# Patient Record
Sex: MALE | Race: BLACK OR AFRICAN AMERICAN | NOT HISPANIC OR LATINO | Employment: UNEMPLOYED | URBAN - METROPOLITAN AREA
[De-identification: names, ages, dates, MRNs, and addresses within clinical notes are randomized per-mention and may not be internally consistent; named-entity substitution may affect disease eponyms.]

---

## 2017-06-10 ENCOUNTER — OFFICE VISIT (OUTPATIENT)
Dept: FAMILY MEDICINE | Facility: CLINIC | Age: 27
End: 2017-06-10
Payer: COMMERCIAL

## 2017-06-10 VITALS
TEMPERATURE: 98 F | SYSTOLIC BLOOD PRESSURE: 89 MMHG | WEIGHT: 123.56 LBS | HEART RATE: 54 BPM | DIASTOLIC BLOOD PRESSURE: 63 MMHG | BODY MASS INDEX: 18.79 KG/M2

## 2017-06-10 DIAGNOSIS — J06.9 UPPER RESPIRATORY TRACT INFECTION, UNSPECIFIED TYPE: Primary | ICD-10-CM

## 2017-06-10 PROCEDURE — 99213 OFFICE O/P EST LOW 20 MIN: CPT | Mod: S$GLB,,, | Performed by: FAMILY MEDICINE

## 2017-06-10 PROCEDURE — 99999 PR PBB SHADOW E&M-EST. PATIENT-LVL II: CPT | Mod: PBBFAC,,, | Performed by: FAMILY MEDICINE

## 2017-06-10 RX ORDER — METHYLPREDNISOLONE 4 MG/1
TABLET ORAL
Qty: 1 PACKAGE | Refills: 0 | Status: SHIPPED | OUTPATIENT
Start: 2017-06-10 | End: 2017-07-01

## 2017-06-10 NOTE — PROGRESS NOTES
Jayjay Monge presents with moderate upper respiratory congestion,rhinnorhea,moderate cough past 2-3 days. OTC help slightly. Denies nausea,vomiting,diarrhea or significant fever.    Past Medical History:   Diagnosis Date    Asthma      Past Surgical History:   Procedure Laterality Date    NOSE SURGERY       Review of patient's allergies indicates:   Allergen Reactions    Zithromax [azithromycin] Hives     Current Outpatient Prescriptions on File Prior to Visit   Medication Sig Dispense Refill    alprazolam (XANAX) 0.25 MG tablet Take 1 tablet (0.25 mg total) by mouth 3 (three) times daily as needed for Anxiety. 60 tablet 0    [DISCONTINUED] cephALEXin (KEFLEX) 500 MG capsule Take 1 capsule (500 mg total) by mouth every 12 (twelve) hours. 14 capsule 0     Current Facility-Administered Medications on File Prior to Visit   Medication Dose Route Frequency Provider Last Rate Last Dose    promethazine injection 25 mg  25 mg Intramuscular 1 time in Clinic/HOD Rosalino Coyle MD         Social History     Social History    Marital status: Single     Spouse name: N/A    Number of children: N/A    Years of education: N/A     Occupational History     [employer]     Social History Main Topics    Smoking status: Current Every Day Smoker    Smokeless tobacco: Never Used    Alcohol use Yes    Drug use: No    Sexual activity: Not on file     Other Topics Concern    Not on file     Social History Narrative    No narrative on file     Family History   Problem Relation Age of Onset    Mitral valve prolapse Mother     Diabetes Father     Heart disease Father          ROS:  SKIN: No rashes, itching or changes in color or texture of skin.  EYES: Visual acuity fine. No photophobia, ocular pain or diplopia.EARS: Denies ear pain, discharge or vertigo.NOSE: No loss of smell, no epistaxis some postnasal drip.MOUTH & THROAT: No hoarseness or change in voice. No excessive gum bleeding.CHEST:  Denies LAM, cyanosis, wheezing  CARDIOVASCULAR: Denies chest pain, PND, orthopnea or reduced exercise tolerance.  ABDOMEN:  No weight loss.No abdominal pain, no hematemesis or blood in stool.  URINARY: No flank pain, dysuria or hematuria.  PERIPHERAL VASCULAR: No claudication or cyanosis.  MUSCULOSKELETAL: Negative   NEUROLOGIC: No history of seizures, paralysis, alteration of gait or coordination.    PE: Vital signs as noted  Heent:Normocephalic with no recent cranial trauma,PERRLA,EOMI,conjunctiva clear,fundi reveal no hemmorhage exudate or papilledema.Otic canals clear, tympanic membranes slightly dull bilaterally.Nasal mucosa slightly red and edematous.Posterior pharynx slightly red but without exudate.  Neck:Supple with minimal anterior cervical adenopathy.  Chest:Clear bilateral breath sounds with mild scattered ronchi  Heart:Regular rhthym without murmer  Abdomen:Soft, non tender,no masses, no hepatosplenomegalyExtremeties and Neurologic:Grossly within normal limits  Impression: Upper Respiratory Infection. 465.9  Plan:   Medrpl dspk

## 2017-11-20 ENCOUNTER — OFFICE VISIT (OUTPATIENT)
Dept: FAMILY MEDICINE | Facility: CLINIC | Age: 27
End: 2017-11-20
Payer: COMMERCIAL

## 2017-11-20 ENCOUNTER — TELEPHONE (OUTPATIENT)
Dept: FAMILY MEDICINE | Facility: CLINIC | Age: 27
End: 2017-11-20

## 2017-11-20 VITALS
HEART RATE: 80 BPM | DIASTOLIC BLOOD PRESSURE: 56 MMHG | SYSTOLIC BLOOD PRESSURE: 100 MMHG | HEIGHT: 68 IN | TEMPERATURE: 99 F | WEIGHT: 123.44 LBS | BODY MASS INDEX: 18.71 KG/M2

## 2017-11-20 DIAGNOSIS — L02.01 ABSCESS OF CHIN: Primary | ICD-10-CM

## 2017-11-20 DIAGNOSIS — L02.01 ABSCESS OF CHIN: ICD-10-CM

## 2017-11-20 PROCEDURE — 99213 OFFICE O/P EST LOW 20 MIN: CPT | Mod: 25,S$GLB,, | Performed by: NURSE PRACTITIONER

## 2017-11-20 PROCEDURE — 99999 PR PBB SHADOW E&M-EST. PATIENT-LVL III: CPT | Mod: PBBFAC,,, | Performed by: NURSE PRACTITIONER

## 2017-11-20 PROCEDURE — 10060 I&D ABSCESS SIMPLE/SINGLE: CPT | Mod: S$GLB,,, | Performed by: NURSE PRACTITIONER

## 2017-11-20 RX ORDER — ACETAMINOPHEN AND CODEINE PHOSPHATE 300; 60 MG/1; MG/1
1 TABLET ORAL
Qty: 10 TABLET | Refills: 0 | Status: SHIPPED | OUTPATIENT
Start: 2017-11-20 | End: 2017-11-23

## 2017-11-20 RX ORDER — ACETAMINOPHEN AND CODEINE PHOSPHATE 300; 60 MG/1; MG/1
1 TABLET ORAL
Qty: 10 TABLET | Refills: 0 | Status: SHIPPED | OUTPATIENT
Start: 2017-11-20 | End: 2017-11-20 | Stop reason: SDUPTHER

## 2017-11-20 NOTE — PATIENT INSTRUCTIONS
Remove packing in 3 days  Clean area daily with antibacterial soap such as Gold Dial or Lever 2000  Change dressing daily and as needed  Complete all of Bactrim Ds  Report to ER if symptoms worsen    Abscess (Incision & Drainage)  An abscess is sometimes called a boil. It happens when bacteria get trapped under the skin and start to grow. Pus forms inside the abscess as the body responds to the bacteria. An abscess can happen with an insect bite, ingrown hair, blocked oil gland, pimple, cyst, or puncture wound.  Your healthcare provider has drained the pus from your abscess. If the abscess pocket was large, your healthcare provider may have put in gauze packing. Your provider will need to remove it on your next visit. He or she may also replace it at that time. You may not need antibiotics to treat a simple abscess, unless the infection is spreading into the skin around the wound (cellulitis).  The wound will take about 1 to 2 weeks to heal, depending on the size of the abscess. Healthy tissue will grow from the bottom and sides of the opening until it seals over.  Home care  These tips can help your wound heal:  · The wound may drain for the first 2 days. Cover the wound with a clean dry dressing. Change the dressing if it becomes soaked with blood or pus.  · If a gauze packing was placed inside the abscess pocket, you may be told to remove it yourself. You may do this in the shower. Once the packing is removed, you should wash the area in the shower, or clean the area as directed by your provider. Continue to do this until the skin opening has closed. Make sure you wash your hands after changing the packing or cleaning the wound.  · If you were prescribed antibiotics, take them as directed until they are all gone.  · You may use acetaminophen or ibuprofen to control pain, unless another pain medicine was prescribed. If you have liver disease or ever had a stomach ulcer, talk with your doctor before using these  medicines.  Follow-up care  Follow up with your healthcare provider, or as advised. If a gauze packing was put in your wound, it should be removed in 1 to 2 days. Check your wound every day for any signs that the infection is getting worse. The signs are listed below.  When to seek medical advice  Call your healthcare provider right away if any of these occur:  · Increasing redness or swelling  · Red streaks in the skin leading away from the wound  · Increasing local pain or swelling  · Continued pus draining from the wound 2 days after treatment  · Fever of 100.4ºF (38ºC) or higher, or as directed by your healthcare provider  · Boil returns when you are at home  Date Last Reviewed: 9/1/2016  © 6186-5403 [employer]. [address] [place] [address], [place] [address]. All rights reserved. This information is not intended as a substitute for professional medical care. Always follow your healthcare professional's instructions.

## 2017-11-20 NOTE — PROGRESS NOTES
"Subjective:      Patient ID: Jayjay Monge is a 27 y.o. male.    Chief Complaint: Follow-up    Patient presents today after ER visit last night for "folliculitis" on chin.  He reports the ER "squeezed" the his chin and was able to get a small amount of purulent drainage out and sent him home with prescription for Bactrim DS and Bactroban.  He reports when he awakened this morning, his chin was more swollen and it has increased in size and pain.      Abscess   Chronicity:  NewProgression Since Onset: rapidly worsening  Location:  Face  Associated Symptoms: no fever, no chills, no sweats  Characteristics: painful, redness and swelling    Pain Scale:  7/10  Treatments Tried:  Warm compresses, topical antibiotics, draining/squeezing and oral antibiotics  Relieved by:  Nothing  Worsened by:  Nothing    Review of Systems   Constitutional: Negative for chills, fatigue and fever.   Respiratory: Negative for cough, shortness of breath and wheezing.    Cardiovascular: Negative for chest pain, palpitations and leg swelling.   Skin: Positive for wound. Negative for rash.   Neurological: Negative for weakness and numbness.   Psychiatric/Behavioral: The patient is not nervous/anxious.        Objective:     BP (!) 100/56   Pulse 80   Temp 98.7 °F (37.1 °C) (Oral)   Ht 5' 8" (1.727 m)   Wt 56 kg (123 lb 7.3 oz)   BMI 18.77 kg/m²     Physical Exam   Constitutional: He is oriented to person, place, and time. He appears well-developed and well-nourished.   HENT:   Head: Normocephalic.       Eyes: Pupils are equal, round, and reactive to light.   Cardiovascular: Normal rate, regular rhythm and normal heart sounds.    Pulmonary/Chest: Effort normal and breath sounds normal. No respiratory distress. He has no wheezes. He has no rales.   Neurological: He is alert and oriented to person, place, and time.   Skin: Skin is warm and dry. No rash noted.   Psychiatric: He has a normal mood and affect. His behavior is normal. " Judgment and thought content normal.   Vitals reviewed.    Assessment:     1. Abscess of chin        Plan:   Abscess of chin  -     acetaminophen-codeine 300-60mg (TYLENOL #4) 300-60 mg Tab; Take 1 tablet by mouth every 6 to 8 hours as needed.  Dispense: 10 tablet; Refill: 0    I&D as follows:  Consent signed prior to procedure.  Area cleaned with betadine, alcohol x 3.  Anesthetized with 2 cc of 2% plain lidocaine.  #11 blade used and 1 cm incision was made using sterile technique.  Purulent and serous substances drained from area and then packed with 1/4 inch sterile gauze.  Area cleaned and gauze and tape dressing was applied.  Patient tolerated procedure well.    Discussed care of wound with patient and his mother.  Educational handout provided.  Remove packing in 3 days  Clean area daily with antibacterial soap such as Gold Dial or Lever 2000  Change dressing daily and as needed  Complete all of Bactrim Ds and continue using Bactroban as directed  Report to ER if symptoms worsen  The patient was checked in the [facility] Board of Pharmacy's  Prescription Monitoring Program. There appears to be no incongruities with the patient's verbalized history.     Return in about 1 week (around 11/27/2017), or if symptoms worsen or fail to improve, for wound check.

## 2017-11-20 NOTE — TELEPHONE ENCOUNTER
----- Message from Adriana Felix sent at 11/20/2017  3:31 PM CST -----  Contact: Yeni/mom  Yeni called to speak with the nurse; the prescription for Tylenol 4 was sent to [facility] in [place] and they don't have it. The pharmacy said that the [facility] in [place] has it; so a new prescription should be sent to them.    [facility]  [address] [place] [id number]  [phone].    She can be contacted at [phone].    Thanks,  Adriana

## 2017-11-22 ENCOUNTER — OFFICE VISIT (OUTPATIENT)
Dept: FAMILY MEDICINE | Facility: CLINIC | Age: 27
End: 2017-11-22
Payer: COMMERCIAL

## 2017-11-22 VITALS
HEART RATE: 61 BPM | TEMPERATURE: 98 F | DIASTOLIC BLOOD PRESSURE: 75 MMHG | BODY MASS INDEX: 18.77 KG/M2 | SYSTOLIC BLOOD PRESSURE: 116 MMHG | WEIGHT: 123.44 LBS

## 2017-11-22 DIAGNOSIS — L02.01 ABSCESS OF CHIN: Primary | ICD-10-CM

## 2017-11-22 PROCEDURE — 99213 OFFICE O/P EST LOW 20 MIN: CPT | Mod: 25,S$GLB,, | Performed by: NURSE PRACTITIONER

## 2017-11-22 PROCEDURE — 99999 PR PBB SHADOW E&M-EST. PATIENT-LVL III: CPT | Mod: PBBFAC,,, | Performed by: NURSE PRACTITIONER

## 2017-11-22 NOTE — PROGRESS NOTES
Subjective:      Patient ID: Jayjay Monge is a 27 y.o. male.    Chief Complaint: No chief complaint on file.    HPI   Patient presents to clinic for a recheck of a chin abscess that was drained and packed 2 days ago.  He reports the swelling has significantly decreased and his symptoms have improved.  He reports he is still taking his Bactrim as prescribed.  He is requesting his packing be removed.    Review of Systems   Constitutional: Negative for chills, fatigue and fever.   Respiratory: Negative for cough, shortness of breath and wheezing.    Cardiovascular: Negative for chest pain, palpitations and leg swelling.   Skin: Positive for wound. Negative for rash.   Neurological: Negative for weakness and numbness.   Psychiatric/Behavioral: The patient is not nervous/anxious.        Objective:     /75   Pulse 61   Temp 98.4 °F (36.9 °C)   Wt 56 kg (123 lb 7.3 oz)   BMI 18.77 kg/m²     Physical Exam   Constitutional: He is oriented to person, place, and time. He appears well-developed and well-nourished.   HENT:   Head: Normocephalic.       Eyes: Pupils are equal, round, and reactive to light.   Cardiovascular: Normal rate, regular rhythm and normal heart sounds.    Pulmonary/Chest: Effort normal and breath sounds normal. No respiratory distress. He has no wheezes. He has no rales.   Neurological: He is alert and oriented to person, place, and time.   Skin: Skin is warm and dry. No rash noted.   Psychiatric: He has a normal mood and affect. His behavior is normal. Judgment and thought content normal.   Vitals reviewed.    Assessment:     1. Abscess of chin        Plan:   Abscess of chin    Packing removed from chin and purulent drainage was expressed from area.  Cleansed area with wound  and new gauze dressing placed.  Instructed patient to continue care as discussed on Monday:  Warm compresses to area; use antibacterial soap; complete Bactrim DS; good hand hygiene  Educational handout  provided  Report to ER if symptoms worsen    Return if symptoms worsen or fail to improve.

## 2017-11-22 NOTE — PATIENT INSTRUCTIONS
Wound Care After Packing Removal or Replacement  Packing is a special type of dressing placed inside a wound to help it heal. Once the packing is removed, you need to care for your wound. Good wound care helps prevent infection. Be sure to keep all follow-up appointments with your healthcare provider. Follow these instructions to take care of the wound once youre at home.  Home care  Your healthcare provider may prescribe medicines for pain. Or he or she may suggest an over-the-counter (OTC) pain reliever, such as ibuprofen or acetaminophen. Talk with your healthcare provider before taking any OTC medicines if you have chronic liver or kidney disease, a stomach ulcer, or gastrointestinal bleeding. In certain cases, you may also need to take antibiotics to help prevent infection. If so, take them exactly as directed for as long as directed. Dont stop taking your antibiotics until they are all gone, even if you feel better.  Here are some general care guidelines for your wound:  · Follow your healthcare providers instructions on how to care for your wound. Always wash your hands with soap and warm water before and after tending to your wound.  · If a bandage was put on, remove and change it once a day or as directed. If the bandage gets wet or dirty, replace it as soon as possible with a new bandage. Use a clean cloth to gently pat the wound dry.  · If your packing was replaced, a small piece of gauze may hang from the wound. It allows fluid to continue draining from the wound. You may need to use an ointment or cream to keep the packing from sticking to the bandage.  · Don't bathe in a tub or soak your wound until your healthcare provider says its OK. Take showers or sponge baths instead. Avoid swimming.  · Dont scratch, rub, scrub, or pick your wound.  · Check your wound daily for the signs of infection listed below.  If your wound was closed, it was likely with one of four types of closures. These include  stitches (sutures), strips of surgical tape, skin glue, and staples. Your healthcare provider will decide on the best closure based on the size and location of your wound. Each type of closure needs specific care.  · Sutures. You may want to clean the wound daily after the first 2 to 3 days. To do this, remove the bandage and gently wash the area with soap and warm water. After cleaning, apply a thin layer of antibiotic ointment if recommended. Then put on a new bandage. Sutures on the outside of the skin usually need to be removed by your healthcare provider.  · Surgical tape. Keep the area dry. If it gets wet, blot it dry with a towel. Surgical tape closures usually fall off within 7 to 10 days. If they have not fallen off after 10 days, you can remove them yourself. To remove the tape, use mineral oil or petroleum jelly on a cotton ball to gently rub the adhesive.  · Skin glue. You may shower or bathe as usual. But do not use soaps, lotions, or ointments on the wound area. Do not scrub the wound. After bathing, pat the wound dry with a soft towel. Do not apply liquids like peroxide, ointments, or creams to the wound while the strips or film is in place. Don't scratch, rub, or pick at the strips or film. Don't put tape directly over the strips or film. Skin adhesive film will fall off naturally in 5 to 10 days. If it does not peel off in 10 days, gently rub petroleum jelly or an ointment onto the film.  · Staples. Take showers or sponge baths. Don't take tub baths. Don't use lotions on the wound area. The area may be cleaned with soap and water 2 to 3 days after the wound was stapled. Don't scrub the wound. Pat it dry with a clean soft cloth or towel. You can use antibiotic ointment if your healthcare provider tells you to. Staples will need to be removed in 10 to 14 days.  Follow-up care  Follow up with your healthcare provider, or as directed. If your packing was replaced, you may need another visit within 1 to  3 days to remove or replace it. If you have sutures or staples, return for their removal as directed.  When to seek medical advice  Call your health care provider right away if you have signs of infection:  · Fever of 1 degree or more above your normal temperature, or as directed by your healthcare provider  · Increasing pain in the wound or pain that doesnt get better even with pain medicine  · Increasing redness or swelling  · Pus or bad-smelling drainage from the wound  Also call your healthcare provider right away if any of these occur:  · Your wound bleeds more than a small amount or wont stop bleeding.  · The edges of your wound come apart.  · You have numbness or weakness in the wound area that doesnt go away.  Date Last Reviewed: 10/2/2015  © 8937-4333 [employer]. [address] [place] [address], [place] [address]. All rights reserved. This information is not intended as a substitute for professional medical care. Always follow your healthcare professional's instructions.

## 2018-11-26 ENCOUNTER — OFFICE VISIT (OUTPATIENT)
Dept: FAMILY MEDICINE | Facility: CLINIC | Age: 28
End: 2018-11-26
Payer: MEDICAID

## 2018-11-26 VITALS
WEIGHT: 127 LBS | TEMPERATURE: 98 F | HEIGHT: 68 IN | BODY MASS INDEX: 19.25 KG/M2 | SYSTOLIC BLOOD PRESSURE: 110 MMHG | DIASTOLIC BLOOD PRESSURE: 69 MMHG | HEART RATE: 82 BPM

## 2018-11-26 DIAGNOSIS — J06.9 UPPER RESPIRATORY TRACT INFECTION, UNSPECIFIED TYPE: Primary | ICD-10-CM

## 2018-11-26 PROCEDURE — 99213 OFFICE O/P EST LOW 20 MIN: CPT | Mod: PBBFAC,PO | Performed by: NURSE PRACTITIONER

## 2018-11-26 PROCEDURE — 99999 PR PBB SHADOW E&M-EST. PATIENT-LVL III: CPT | Mod: PBBFAC,,, | Performed by: NURSE PRACTITIONER

## 2018-11-26 PROCEDURE — 99213 OFFICE O/P EST LOW 20 MIN: CPT | Mod: S$PBB,,, | Performed by: NURSE PRACTITIONER

## 2018-11-26 RX ORDER — AZELASTINE 1 MG/ML
1 SPRAY, METERED NASAL 2 TIMES DAILY
Qty: 30 ML | Refills: 0 | Status: SHIPPED | OUTPATIENT
Start: 2018-11-26 | End: 2018-11-30

## 2018-11-26 RX ORDER — LEVOCETIRIZINE DIHYDROCHLORIDE 5 MG/1
5 TABLET, FILM COATED ORAL NIGHTLY
Qty: 30 TABLET | Refills: 0 | Status: SHIPPED | OUTPATIENT
Start: 2018-11-26 | End: 2018-12-23 | Stop reason: SDUPTHER

## 2018-11-26 RX ORDER — METHYLPREDNISOLONE 4 MG/1
TABLET ORAL
Qty: 1 PACKAGE | Refills: 0 | Status: SHIPPED | OUTPATIENT
Start: 2018-11-26 | End: 2018-11-30

## 2018-11-26 NOTE — PROGRESS NOTES
Subjective:       Patient ID: Jayjay Monge is a 28 y.o. male.    Chief Complaint: Nasal Congestion (a) and Sore Throat    URI    This is a new problem. The current episode started yesterday. The problem has been unchanged. There has been no fever. Associated symptoms include congestion and rhinorrhea. Pertinent negatives include no abdominal pain, chest pain, coughing, diarrhea, dysuria, ear pain, headaches, joint pain, joint swelling, nausea, neck pain, plugged ear sensation, rash, sinus pain, sneezing, sore throat, swollen glands, vomiting or wheezing. Associated symptoms comments: Sinus pressure. He has tried nothing for the symptoms. The treatment provided no relief.     Past Medical History:   Diagnosis Date    Asthma      Social History     Socioeconomic History    Marital status: Single     Spouse name: Not on file    Number of children: Not on file    Years of education: Not on file    Highest education level: Not on file   Social Needs    Financial resource strain: Not on file    Food insecurity - worry: Not on file    Food insecurity - inability: Not on file    Transportation needs - medical: Not on file    Transportation needs - non-medical: Not on file   Occupational History     Employer: [employer]   Tobacco Use    Smoking status: Current Every Day Smoker    Smokeless tobacco: Never Used   Substance and Sexual Activity    Alcohol use: Yes    Drug use: No    Sexual activity: Not on file   Other Topics Concern    Not on file   Social History Narrative    Not on file     Past Surgical History:   Procedure Laterality Date    NOSE SURGERY         Review of Systems   Constitutional: Negative.    HENT: Positive for congestion, rhinorrhea and sinus pressure. Negative for ear pain, sinus pain, sneezing and sore throat.    Eyes: Negative.    Respiratory: Negative.  Negative for cough and wheezing.    Cardiovascular: Negative.  Negative for chest pain.    Gastrointestinal: Negative.  Negative for abdominal pain, diarrhea, nausea and vomiting.   Endocrine: Negative.    Genitourinary: Negative.  Negative for dysuria.   Musculoskeletal: Negative.  Negative for joint pain and neck pain.   Skin: Negative.  Negative for rash.   Allergic/Immunologic: Negative.    Neurological: Negative.  Negative for headaches.   Psychiatric/Behavioral: Negative.        Objective:      Physical Exam   Constitutional: He is oriented to person, place, and time. He appears well-developed and well-nourished.   HENT:   Head: Normocephalic.   Right Ear: Hearing, tympanic membrane, external ear and ear canal normal.   Left Ear: Hearing, tympanic membrane, external ear and ear canal normal.   Nose: Rhinorrhea present. Right sinus exhibits no maxillary sinus tenderness and no frontal sinus tenderness. Left sinus exhibits no maxillary sinus tenderness and no frontal sinus tenderness.   Mouth/Throat: Uvula is midline, oropharynx is clear and moist and mucous membranes are normal.   Eyes: Conjunctivae are normal. Pupils are equal, round, and reactive to light.   Neck: Normal range of motion. Neck supple.   Cardiovascular: Normal rate, regular rhythm and normal heart sounds.   Pulmonary/Chest: Effort normal and breath sounds normal.   Abdominal: Soft. Bowel sounds are normal.   Musculoskeletal: Normal range of motion.   Neurological: He is alert and oriented to person, place, and time.   Skin: Skin is warm and dry. Capillary refill takes 2 to 3 seconds.   Psychiatric: He has a normal mood and affect. His behavior is normal. Judgment and thought content normal.   Nursing note and vitals reviewed.      Assessment:       1. Upper respiratory tract infection, unspecified type        Plan:           Jayjay Rogel was seen today for nasal congestion and sore throat.    Diagnoses and all orders for this visit:    Upper respiratory tract infection, unspecified type  -     methylPREDNISolone (MEDROL DOSEPACK)  4 mg tablet; use as directed  -     azelastine (ASTELIN) 137 mcg (0.1 %) nasal spray; 1 spray (137 mcg total) by Nasal route 2 (two) times daily.  -     levocetirizine (XYZAL) 5 MG tablet; Take 1 tablet (5 mg total) by mouth every evening.

## 2018-11-30 ENCOUNTER — OFFICE VISIT (OUTPATIENT)
Dept: FAMILY MEDICINE | Facility: CLINIC | Age: 28
End: 2018-11-30
Payer: MEDICAID

## 2018-11-30 VITALS
DIASTOLIC BLOOD PRESSURE: 76 MMHG | BODY MASS INDEX: 18.64 KG/M2 | HEIGHT: 68 IN | TEMPERATURE: 98 F | HEART RATE: 111 BPM | SYSTOLIC BLOOD PRESSURE: 111 MMHG | WEIGHT: 123 LBS

## 2018-11-30 DIAGNOSIS — J01.00 ACUTE NON-RECURRENT MAXILLARY SINUSITIS: Primary | ICD-10-CM

## 2018-11-30 PROCEDURE — 99213 OFFICE O/P EST LOW 20 MIN: CPT | Mod: PBBFAC,PO | Performed by: NURSE PRACTITIONER

## 2018-11-30 PROCEDURE — 99213 OFFICE O/P EST LOW 20 MIN: CPT | Mod: S$PBB,,, | Performed by: NURSE PRACTITIONER

## 2018-11-30 PROCEDURE — 99999 PR PBB SHADOW E&M-EST. PATIENT-LVL III: CPT | Mod: PBBFAC,,, | Performed by: NURSE PRACTITIONER

## 2018-11-30 RX ORDER — FLUTICASONE PROPIONATE 50 MCG
2 SPRAY, SUSPENSION (ML) NASAL DAILY
Qty: 1 BOTTLE | Refills: 0 | Status: SHIPPED | OUTPATIENT
Start: 2018-11-30 | End: 2018-12-31 | Stop reason: SDUPTHER

## 2018-11-30 RX ORDER — AMOXICILLIN AND CLAVULANATE POTASSIUM 875; 125 MG/1; MG/1
1 TABLET, FILM COATED ORAL EVERY 12 HOURS
Qty: 20 TABLET | Refills: 0 | Status: SHIPPED | OUTPATIENT
Start: 2018-11-30 | End: 2018-12-10

## 2018-11-30 NOTE — PROGRESS NOTES
"Subjective:      Patient ID: Jayjay Monge is a 28 y.o. male.    Chief Complaint: Cough; Sore Throat; and Headache    Sinus Problem   This is a new problem. The current episode started in the past 7 days. The problem has been gradually worsening since onset. There has been no fever. The pain is moderate. Associated symptoms include congestion, coughing, headaches, sinus pressure, a sore throat and swollen glands. Pertinent negatives include no chills, ear pain, hoarse voice, neck pain or shortness of breath. Treatments tried: Treated with Medrol Dosepak, Xyzal, and Astelin on Monday.  He did not tolerate the Astelin stating gave him a headache.  He missed the last 2 days of his steroid pack. The treatment provided no relief.     Review of Systems   Constitutional: Negative for chills, fatigue and fever.   HENT: Positive for congestion, postnasal drip, rhinorrhea, sinus pressure and sore throat. Negative for ear pain, hoarse voice and sinus pain.    Eyes: Negative.    Respiratory: Positive for cough. Negative for shortness of breath and wheezing.    Cardiovascular: Negative for chest pain, palpitations and leg swelling.   Gastrointestinal: Negative.    Endocrine: Negative.    Genitourinary: Negative.    Musculoskeletal: Negative.  Negative for neck pain.   Skin: Negative for rash and wound.   Neurological: Positive for headaches.   Hematological: Negative.    Psychiatric/Behavioral: The patient is not nervous/anxious.        Objective:     /76   Pulse (!) 111   Temp 98.2 °F (36.8 °C)   Ht 5' 8" (1.727 m)   Wt 55.8 kg (123 lb)   BMI 18.70 kg/m²     Physical Exam   Constitutional: He is oriented to person, place, and time. He appears well-developed and well-nourished.   HENT:   Head: Normocephalic.   Right Ear: Tympanic membrane is bulging.   Left Ear: Tympanic membrane is bulging.   Nose: Mucosal edema and rhinorrhea (nasal mucosa erythematous and boggy) present. Right sinus exhibits maxillary sinus " tenderness. Right sinus exhibits no frontal sinus tenderness. Left sinus exhibits maxillary sinus tenderness. Left sinus exhibits no frontal sinus tenderness.   Mouth/Throat: Posterior oropharyngeal edema and posterior oropharyngeal erythema (clear, post nasal drainage noted to posterior oropharynx) present. No oropharyngeal exudate. Tonsils are 1+ on the right. Tonsils are 1+ on the left. No tonsillar exudate.   Eyes: Conjunctivae and lids are normal. Pupils are equal, round, and reactive to light.   Neck: Normal range of motion and full passive range of motion without pain. Neck supple.   Cardiovascular: Normal rate, regular rhythm and normal heart sounds.   Pulmonary/Chest: Effort normal and breath sounds normal. No respiratory distress. He has no decreased breath sounds. He has no wheezes. He has no rhonchi. He has no rales.   Lymphadenopathy:        Head (right side): Tonsillar adenopathy present.        Head (left side): Tonsillar adenopathy present.     He has no cervical adenopathy.   Neurological: He is alert and oriented to person, place, and time.   Skin: Skin is warm and dry. No rash noted.   Psychiatric: He has a normal mood and affect. His behavior is normal. Judgment and thought content normal.     Assessment:     1. Acute non-recurrent maxillary sinusitis        Plan:     Problem List Items Addressed This Visit     None      Visit Diagnoses     Acute non-recurrent maxillary sinusitis    -  Primary    Relevant Medications    amoxicillin-clavulanate 875-125mg (AUGMENTIN) 875-125 mg per tablet    fluticasone (FLONASE) 50 mcg/actuation nasal spray      Complete last days of Medrol Dosepak.  Symptomatic care discussed   Report to ER if symptoms worsen    Follow-up if symptoms worsen or fail to improve.        Parts of this note was dictated using voice recognition software. Please excuse any grammatical or typographical errors.

## 2018-12-24 RX ORDER — AZELASTINE 1 MG/ML
SPRAY, METERED NASAL
Qty: 30 ML | Refills: 11 | Status: SHIPPED | OUTPATIENT
Start: 2018-12-24 | End: 2019-12-18

## 2018-12-24 RX ORDER — LEVOCETIRIZINE DIHYDROCHLORIDE 5 MG/1
TABLET, FILM COATED ORAL
Qty: 30 TABLET | Refills: 11 | Status: SHIPPED | OUTPATIENT
Start: 2018-12-24 | End: 2019-12-18

## 2018-12-31 DIAGNOSIS — J01.00 ACUTE NON-RECURRENT MAXILLARY SINUSITIS: ICD-10-CM

## 2018-12-31 RX ORDER — FLUTICASONE PROPIONATE 50 MCG
SPRAY, SUSPENSION (ML) NASAL
Qty: 16 ML | Refills: 12 | Status: SHIPPED | OUTPATIENT
Start: 2018-12-31 | End: 2019-12-18

## 2019-08-28 ENCOUNTER — TELEPHONE (OUTPATIENT)
Dept: FAMILY MEDICINE | Facility: CLINIC | Age: 29
End: 2019-08-28

## 2019-08-28 NOTE — TELEPHONE ENCOUNTER
----- Message from Ayleen Levine sent at 8/28/2019  9:29 AM CDT -----  Contact: Mother  Type:  Same Day Appointment Request with Ms. Leslie Jackson    Caller is requesting a same day appointment.  Caller declined first available appointment listed below.    Name of Caller:Mother-Yeni Monge  When is the first available appointment?8/29/19  Symptoms:susana urine-sore throat-headaches  Best Call Back Number:[phone]  Additional Information: n/a

## 2019-08-29 ENCOUNTER — HOSPITAL ENCOUNTER (OUTPATIENT)
Dept: RADIOLOGY | Facility: HOSPITAL | Age: 29
Discharge: HOME | End: 2019-08-29
Attending: NURSE PRACTITIONER
Payer: COMMERCIAL

## 2019-08-29 ENCOUNTER — OFFICE VISIT (OUTPATIENT)
Dept: FAMILY MEDICINE | Facility: CLINIC | Age: 29
End: 2019-08-29
Payer: COMMERCIAL

## 2019-08-29 VITALS
BODY MASS INDEX: 18.91 KG/M2 | HEART RATE: 84 BPM | TEMPERATURE: 99 F | SYSTOLIC BLOOD PRESSURE: 104 MMHG | HEIGHT: 68 IN | WEIGHT: 124.81 LBS | DIASTOLIC BLOOD PRESSURE: 73 MMHG

## 2019-08-29 DIAGNOSIS — Z11.3 SCREENING FOR STDS (SEXUALLY TRANSMITTED DISEASES): ICD-10-CM

## 2019-08-29 DIAGNOSIS — R05.9 COUGH: ICD-10-CM

## 2019-08-29 DIAGNOSIS — R50.9 FEVER, UNSPECIFIED FEVER CAUSE: ICD-10-CM

## 2019-08-29 DIAGNOSIS — R06.2 WHEEZING: ICD-10-CM

## 2019-08-29 DIAGNOSIS — R82.998 DARK URINE: ICD-10-CM

## 2019-08-29 DIAGNOSIS — J40 BRONCHITIS: Primary | ICD-10-CM

## 2019-08-29 LAB
BACTERIA #/AREA URNS HPF: ABNORMAL /HPF
BILIRUB UR QL STRIP: NEGATIVE
CLARITY UR: CLEAR
COLOR UR: YELLOW
GLUCOSE UR QL STRIP: NEGATIVE
HGB UR QL STRIP: NEGATIVE
HYALINE CASTS #/AREA URNS LPF: 0 /LPF
KETONES UR QL STRIP: ABNORMAL
LEUKOCYTE ESTERASE UR QL STRIP: NEGATIVE
MICROSCOPIC COMMENT: ABNORMAL
NITRITE UR QL STRIP: NEGATIVE
PH UR STRIP: 6 [PH] (ref 5–8)
PROT UR QL STRIP: ABNORMAL
RBC #/AREA URNS HPF: 1 /HPF (ref 0–4)
SP GR UR STRIP: 1.02 (ref 1–1.03)
SQUAMOUS #/AREA URNS HPF: 6 /HPF
URN SPEC COLLECT METH UR: ABNORMAL
UROBILINOGEN UR STRIP-ACNC: ABNORMAL EU/DL
WBC #/AREA URNS HPF: 3 /HPF (ref 0–5)

## 2019-08-29 PROCEDURE — 71046 XR CHEST PA AND LATERAL: ICD-10-PCS | Mod: 26,,, | Performed by: RADIOLOGY

## 2019-08-29 PROCEDURE — 3008F PR BODY MASS INDEX (BMI) DOCUMENTED: ICD-10-PCS | Mod: CPTII,S$GLB,, | Performed by: NURSE PRACTITIONER

## 2019-08-29 PROCEDURE — 99999 PR PBB SHADOW E&M-EST. PATIENT-LVL III: ICD-10-PCS | Mod: PBBFAC,,, | Performed by: NURSE PRACTITIONER

## 2019-08-29 PROCEDURE — 99214 OFFICE O/P EST MOD 30 MIN: CPT | Mod: S$GLB,,, | Performed by: NURSE PRACTITIONER

## 2019-08-29 PROCEDURE — 81000 URINALYSIS NONAUTO W/SCOPE: CPT | Mod: PO

## 2019-08-29 PROCEDURE — 87491 CHLMYD TRACH DNA AMP PROBE: CPT

## 2019-08-29 PROCEDURE — 3008F BODY MASS INDEX DOCD: CPT | Mod: CPTII,S$GLB,, | Performed by: NURSE PRACTITIONER

## 2019-08-29 PROCEDURE — 99214 PR OFFICE/OUTPT VISIT, EST, LEVL IV, 30-39 MIN: ICD-10-PCS | Mod: S$GLB,,, | Performed by: NURSE PRACTITIONER

## 2019-08-29 PROCEDURE — 71046 X-RAY EXAM CHEST 2 VIEWS: CPT | Mod: 26,,, | Performed by: RADIOLOGY

## 2019-08-29 PROCEDURE — 99999 PR PBB SHADOW E&M-EST. PATIENT-LVL III: CPT | Mod: PBBFAC,,, | Performed by: NURSE PRACTITIONER

## 2019-08-29 PROCEDURE — 71046 X-RAY EXAM CHEST 2 VIEWS: CPT | Mod: TC,PO

## 2019-08-29 RX ORDER — PROMETHAZINE HYDROCHLORIDE AND DEXTROMETHORPHAN HYDROBROMIDE 6.25; 15 MG/5ML; MG/5ML
5 SYRUP ORAL 2 TIMES DAILY PRN
Qty: 118 ML | Refills: 0 | Status: SHIPPED | OUTPATIENT
Start: 2019-08-29 | End: 2019-09-08

## 2019-08-29 RX ORDER — ALBUTEROL SULFATE 90 UG/1
2 AEROSOL, METERED RESPIRATORY (INHALATION) EVERY 6 HOURS PRN
Qty: 18 G | Refills: 0 | Status: SHIPPED | OUTPATIENT
Start: 2019-08-29 | End: 2019-09-17 | Stop reason: SDUPTHER

## 2019-08-29 RX ORDER — METHYLPREDNISOLONE 4 MG/1
TABLET ORAL
Qty: 1 PACKAGE | Refills: 0 | Status: SHIPPED | OUTPATIENT
Start: 2019-08-29 | End: 2019-09-19

## 2019-08-29 RX ORDER — CEFDINIR 300 MG/1
300 CAPSULE ORAL 2 TIMES DAILY
Qty: 20 CAPSULE | Refills: 0 | Status: SHIPPED | OUTPATIENT
Start: 2019-08-29 | End: 2019-09-08

## 2019-08-29 NOTE — PROGRESS NOTES
Subjective:       Patient ID: Jayjay Monge is a 29 y.o. male.    Chief Complaint: congestion, headache, sore throat    Cough   This is a new problem. The current episode started 1 to 4 weeks ago. The problem has been unchanged. The problem occurs every few minutes. The cough is productive of sputum. Associated symptoms include a fever (resolved), headaches, nasal congestion, postnasal drip, rhinorrhea, shortness of breath and wheezing. Pertinent negatives include no chest pain, chills, ear congestion, ear pain, heartburn, hemoptysis, myalgias, rash, sore throat, sweats or weight loss. Nothing aggravates the symptoms. He has tried nothing for the symptoms. The treatment provided no relief. His past medical history is significant for environmental allergies. There is no history of asthma, bronchiectasis, bronchitis, COPD, emphysema or pneumonia.   Pt also states urine has been darker than usual over the past 2-3 d; states has not been hydrating. Denies dysuria, frequency. Pt also requests STD panel; denies known STD exposure or symptoms.  Past Medical History:   Diagnosis Date    Asthma      Social History     Socioeconomic History    Marital status: Single     Spouse name: Not on file    Number of children: Not on file    Years of education: Not on file    Highest education level: Not on file   Occupational History     Employer: [employer]   Social Needs    Financial resource strain: Not on file    Food insecurity:     Worry: Not on file     Inability: Not on file    Transportation needs:     Medical: Not on file     Non-medical: Not on file   Tobacco Use    Smoking status: Current Every Day Smoker    Smokeless tobacco: Never Used   Substance and Sexual Activity    Alcohol use: Yes    Drug use: No    Sexual activity: Not on file   Lifestyle    Physical activity:     Days per week: Not on file     Minutes per session: Not on file    Stress: Not on file   Relationships     Social connections:     Talks on phone: Not on file     Gets together: Not on file     Attends Uatsdin service: Not on file     Active member of club or organization: Not on file     Attends meetings of clubs or organizations: Not on file     Relationship status: Not on file   Other Topics Concern    Not on file   Social History Narrative    Not on file     Past Surgical History:   Procedure Laterality Date    NOSE SURGERY         Review of Systems   Constitutional: Positive for fever (resolved). Negative for chills and weight loss.   HENT: Positive for postnasal drip and rhinorrhea. Negative for ear pain and sore throat.    Eyes: Negative.    Respiratory: Positive for cough, shortness of breath and wheezing. Negative for hemoptysis.    Cardiovascular: Negative.  Negative for chest pain.   Gastrointestinal: Negative.  Negative for heartburn.   Endocrine: Negative.    Genitourinary: Negative.         Dark urine   Musculoskeletal: Negative.  Negative for myalgias.   Skin: Negative.  Negative for rash.   Allergic/Immunologic: Positive for environmental allergies.   Neurological: Positive for headaches.   Psychiatric/Behavioral: Negative.        Objective:      Physical Exam   Constitutional: He is oriented to person, place, and time. He appears well-developed and well-nourished.   HENT:   Head: Normocephalic.   Right Ear: Hearing, tympanic membrane, external ear and ear canal normal.   Left Ear: Hearing, tympanic membrane, external ear and ear canal normal.   Nose: Mucosal edema and rhinorrhea present. Right sinus exhibits maxillary sinus tenderness. Right sinus exhibits no frontal sinus tenderness. Left sinus exhibits maxillary sinus tenderness. Left sinus exhibits no frontal sinus tenderness.   Mouth/Throat: Uvula is midline, oropharynx is clear and moist and mucous membranes are normal.   Eyes: Pupils are equal, round, and reactive to light. Conjunctivae are normal.   Neck: Normal range of motion. Neck supple.    Cardiovascular: Normal rate, regular rhythm and normal heart sounds.   Pulmonary/Chest: Effort normal. He has wheezes in the right upper field, the right lower field, the left upper field and the left lower field.   Abdominal: Soft. Bowel sounds are normal.   Musculoskeletal: Normal range of motion.   Neurological: He is alert and oriented to person, place, and time.   Skin: Skin is warm and dry. Capillary refill takes 2 to 3 seconds.   Psychiatric: He has a normal mood and affect. His behavior is normal. Judgment and thought content normal.   Nursing note and vitals reviewed.      Assessment:       1. Bronchitis    2. Cough    3. Wheezing    4. Fever, unspecified fever cause    5. Screening for STDs (sexually transmitted diseases)    6. Dark urine        Plan:       Jayjay Rogel was seen today for congestion, headache, sore throat.    Diagnoses and all orders for this visit:    Bronchitis  Cough  Wheezing  Fever, unspecified fever cause  -     X-Ray Chest PA And Lateral; Future  -     albuterol (PROAIR HFA) 90 mcg/actuation inhaler; Inhale 2 puffs into the lungs every 6 (six) hours as needed for Wheezing. Rescue  -     promethazine-dextromethorphan (PROMETHAZINE-DM) 6.25-15 mg/5 mL Syrp; Take 5 mLs by mouth 2 (two) times daily as needed.  -     methylPREDNISolone (MEDROL DOSEPACK) 4 mg tablet; use as directed  -     cefdinir (OMNICEF) 300 MG capsule; Take 1 capsule (300 mg total) by mouth 2 (two) times daily. for 10 days      Screening for STDs (sexually transmitted diseases)  -     Urinalysis  -     C. trachomatis/N. gonorrhoeae by AMP DNA [facility]; Urine  -     HIV 1/2 Ag/Ab (4th Gen); Future  -     HEPATITIS PANEL, ACUTE; Future  -     HERPES SIMPLEX 1 & 2 IGM; Future  -     HERPES SIMPLEX 1&2 IGG; Future  -     RPR; Future    Dark urine  -     Urinalysis  -     Urinalysis Microscopic    Hydrate well  Tylenol OTC as directed  Report to ER immediately if symptoms worsen

## 2019-08-30 ENCOUNTER — TELEPHONE (OUTPATIENT)
Dept: FAMILY MEDICINE | Facility: CLINIC | Age: 29
End: 2019-08-30

## 2019-08-30 LAB
C TRACH DNA SPEC QL NAA+PROBE: NOT DETECTED
N GONORRHOEA DNA SPEC QL NAA+PROBE: NOT DETECTED

## 2019-08-30 NOTE — TELEPHONE ENCOUNTER
----- Message from Cecy Christianson sent at 8/30/2019  4:48 PM CDT -----  Contact: mother(Yeni)-[phone]  Type:  Patient Returning Call    Who Called:Jayjay Monge    Who Left Message for Patient:nurse  Does the patient know what this is regarding?:results  Would the patient rather a call back or a response via [facility]? Call back   Best Call Back Number:[phone]  Additional Information:

## 2019-08-31 NOTE — TELEPHONE ENCOUNTER
----- Message from Christiano Flores sent at 8/31/2019  9:42 AM CDT -----  Contact: self [phone]  Pt missed a call yesterday regarding his test results/pls call/thanks.

## 2019-09-03 ENCOUNTER — TELEPHONE (OUTPATIENT)
Dept: FAMILY MEDICINE | Facility: CLINIC | Age: 29
End: 2019-09-03

## 2019-09-03 DIAGNOSIS — R80.9 URINE PROTEIN INCREASED: Primary | ICD-10-CM

## 2019-09-03 NOTE — TELEPHONE ENCOUNTER
----- Message from Leslie Ty NP sent at 8/29/2019  4:35 PM CDT -----  Urine shows protein, trace ketones; likely due to current illness. Increase hydration; recheck UA in 2 w.

## 2019-09-03 NOTE — TELEPHONE ENCOUNTER
----- Message from Dayna Dougherty sent at 9/3/2019  9:26 AM CDT -----  Contact: pt  The pt request a call concerning his test results, no additional info given and can be reached at [phone] or [phone]///[email or web address]

## 2019-09-03 NOTE — TELEPHONE ENCOUNTER
----- Message from Sameera Flores sent at 9/3/2019  3:02 PM CDT -----  ..Type:  Patient Returning Call    Who Called Yeni gallego ( pt mom)   Who Left Message for Patient:  Does the patient know what this is regarding?: excuse from provider   Would the patient rather a call back or a response via [facility]? Call back   Best Call Back Number: [phone]  Additional Information: Yeni gallego ( pt mom) is requesting a call from nurse to f/u on a excuse for the pt

## 2019-09-03 NOTE — TELEPHONE ENCOUNTER
----- Message from Glenna Christianson sent at 9/3/2019 11:32 AM CDT -----  Contact: pt   Pt is calling in regards to getting a doctor excuse for her visit on 08/29/19 & 08/30/19. Pt states that excuse can be faxed to [phone].     [phone] (home)

## 2019-09-04 ENCOUNTER — TELEPHONE (OUTPATIENT)
Dept: FAMILY MEDICINE | Facility: CLINIC | Age: 29
End: 2019-09-04

## 2019-09-04 NOTE — TELEPHONE ENCOUNTER
----- Message from Sameera Flores sent at 9/4/2019  1:29 PM CDT -----  ..Type:  Patient Returning Call    Who Called: Yeni ( Pt mom )   Who Left Message for Patient:  Does the patient know what this is regarding?:excuse  Would the patient rather a call back or a response via [facility]? Call back   Best Call Back Number:[phone]  Additional Information: Yeni ( Pt mom )  states pt will need an excuse for Thurs 8/29 and 8/30

## 2019-09-04 NOTE — TELEPHONE ENCOUNTER
Spoke with Ms Yeni (mom), states she never received the letter of excuse that was faxed to her and would like to pick it up tomorrow, will print letter to place at .

## 2019-09-04 NOTE — TELEPHONE ENCOUNTER
----- Message from Sameera Flores sent at 9/4/2019  1:29 PM CDT -----  ..Type:  Patient Returning Call    Who Called: Yeni ( Pt mom )   Who Left Message for Patient:  Does the patient know what this is regarding?:excuse  Would the patient rather a call back or a response via [facility]? Call back   Best Call Back Number:[phone]  Additional Information: Yeni ( Pt mom )  states pt will need an excuse for Thurs 8/29 and 8/30

## 2019-09-17 RX ORDER — ALBUTEROL SULFATE 90 UG/1
AEROSOL, METERED RESPIRATORY (INHALATION)
Qty: 8.5 INHALER | Refills: 0 | Status: SHIPPED | OUTPATIENT
Start: 2019-09-17 | End: 2019-12-18

## 2019-12-18 ENCOUNTER — OFFICE VISIT (OUTPATIENT)
Dept: FAMILY MEDICINE | Facility: CLINIC | Age: 29
End: 2019-12-18
Payer: COMMERCIAL

## 2019-12-18 VITALS
WEIGHT: 125 LBS | DIASTOLIC BLOOD PRESSURE: 80 MMHG | OXYGEN SATURATION: 95 % | SYSTOLIC BLOOD PRESSURE: 125 MMHG | BODY MASS INDEX: 18.94 KG/M2 | HEART RATE: 75 BPM | RESPIRATION RATE: 16 BRPM | TEMPERATURE: 98 F | HEIGHT: 68 IN

## 2019-12-18 DIAGNOSIS — R68.89 FLU-LIKE SYMPTOMS: ICD-10-CM

## 2019-12-18 DIAGNOSIS — A09 INFECTIOUS DIARRHEA: Primary | ICD-10-CM

## 2019-12-18 PROBLEM — J45.909 ASTHMA: Status: ACTIVE | Noted: 2019-03-19

## 2019-12-18 LAB
INFLUENZA A, MOLECULAR: NEGATIVE
INFLUENZA B, MOLECULAR: NEGATIVE
SPECIMEN SOURCE: NORMAL

## 2019-12-18 PROCEDURE — 87502 INFLUENZA DNA AMP PROBE: CPT | Mod: PO

## 2019-12-18 PROCEDURE — 99999 PR PBB SHADOW E&M-EST. PATIENT-LVL III: CPT | Mod: PBBFAC,,, | Performed by: INTERNAL MEDICINE

## 2019-12-18 PROCEDURE — 99214 PR OFFICE/OUTPT VISIT, EST, LEVL IV, 30-39 MIN: ICD-10-PCS | Mod: S$GLB,,, | Performed by: INTERNAL MEDICINE

## 2019-12-18 PROCEDURE — 99214 OFFICE O/P EST MOD 30 MIN: CPT | Mod: S$GLB,,, | Performed by: INTERNAL MEDICINE

## 2019-12-18 PROCEDURE — 99999 PR PBB SHADOW E&M-EST. PATIENT-LVL III: ICD-10-PCS | Mod: PBBFAC,,, | Performed by: INTERNAL MEDICINE

## 2019-12-18 RX ORDER — DIPHENOXYLATE HYDROCHLORIDE AND ATROPINE SULFATE 2.5; .025 MG/1; MG/1
1 TABLET ORAL 4 TIMES DAILY PRN
Qty: 20 TABLET | Refills: 0 | Status: SHIPPED | OUTPATIENT
Start: 2019-12-18 | End: 2019-12-28

## 2019-12-18 RX ORDER — PROMETHAZINE HYDROCHLORIDE 25 MG/1
25 TABLET ORAL EVERY 6 HOURS PRN
Qty: 20 TABLET | Refills: 0 | Status: SHIPPED | OUTPATIENT
Start: 2019-12-18 | End: 2020-02-14

## 2019-12-18 RX ORDER — CIPROFLOXACIN 500 MG/1
500 TABLET ORAL 2 TIMES DAILY
Qty: 10 TABLET | Refills: 0 | Status: SHIPPED | OUTPATIENT
Start: 2019-12-18 | End: 2019-12-23

## 2019-12-18 RX ORDER — METRONIDAZOLE 500 MG/1
500 TABLET ORAL 3 TIMES DAILY
Qty: 15 TABLET | Refills: 0 | Status: SHIPPED | OUTPATIENT
Start: 2019-12-18 | End: 2019-12-23

## 2019-12-18 NOTE — LETTER
December 18, 2019      [facility]  [address] [place] [address]  Phone: [phone]  Fax: [phone]       Patient: Jayjay Monge   YOB: 1990  Date of Visit: 12/18/2019    To Whom It May Concern:      Morgan Monge  was at [facility] on 12/18/2019. Please excuse him on 12/18/19 and 12/19/19 He may return to work/school after scheduled holidays with no restrictions. If you have any questions or concerns, or if I can be of further assistance, please do not hesitate to contact me.        Sincerely,    MD Cayla Hernandez MA

## 2019-12-18 NOTE — PROGRESS NOTES
Subjective:      Patient ID: Jayjay Monge is a 29 y.o. male.    Chief Complaint: Chills; Fever (101.6); and Diarrhea    HPI     29M here to transition care from Dr. Coyle and discuss abdominal illness.     On Friday he stopped at a convenience store and ordered a turkey neck.  He woke up the next morning with diarrhea profuse and voluminous with no mucus or blood accompanied by chills and nausea.  He had a few episodes of nonbilious emesis.  His abdominal discomfort is cramping in nature.  He took ibuprofen which helped his fever reduced from 101.6 maximum.  No other sick contacts.  He has no history of colon issues.  He did have asthma as a child, but has been on no maintenance or rescue inhalers.  He has been struggling to keep food down.  He did eat and worn just today.  His girlfriend has been trying to give him Gatorade/electrolyte replacement solutions.  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  No 1 else in the household is sick.  He has not taken anything over-the-counter.  He feels his symptoms are worsening and is very weak.  Still with no blood in his stool.    Review of patient's allergies indicates:   Allergen Reactions    Zithromax [azithromycin] Hives       Current Outpatient Medications:     ciprofloxacin HCl (CIPRO) 500 MG tablet, Take 1 tablet (500 mg total) by mouth 2 (two) times daily. for 5 days, Disp: 10 tablet, Rfl: 0    diphenoxylate-atropine 2.5-0.025 mg (LOMOTIL) 2.5-0.025 mg per tablet, Take 1 tablet by mouth 4 (four) times daily as needed for Diarrhea., Disp: 20 tablet, Rfl: 0    metroNIDAZOLE (FLAGYL) 500 MG tablet, Take 1 tablet (500 mg total) by mouth 3 (three) times daily. for 5 days, Disp: 15 tablet, Rfl: 0    promethazine (PHENERGAN) 25 MG tablet, Take 1 tablet (25 mg total) by mouth every 6 (six) hours as needed for Nausea., Disp: 20 tablet, Rfl: 0    Past Medical History:   Diagnosis Date    Asthma      Past Surgical History:   Procedure Laterality Date    NOSE  SURGERY       Family History   Problem Relation Age of Onset    Mitral valve prolapse Mother     Diabetes Father     Heart disease Father      Social History     Socioeconomic History    Marital status: Single     Spouse name: Not on file    Number of children: Not on file    Years of education: Not on file    Highest education level: Not on file   Occupational History     Employer: [employer]   Social Needs    Financial resource strain: Not on file    Food insecurity:     Worry: Not on file     Inability: Not on file    Transportation needs:     Medical: Not on file     Non-medical: Not on file   Tobacco Use    Smoking status: Current Every Day Smoker     Packs/day: 0.25     Years: 10.00     Pack years: 2.50     Types: Cigarettes     Start date: 4/12/2009    Smokeless tobacco: Never Used   Substance and Sexual Activity    Alcohol use: Yes     Frequency: Monthly or less     Drinks per session: 1 or 2     Binge frequency: Never    Drug use: No    Sexual activity: Yes     Partners: Female   Lifestyle    Physical activity:     Days per week: Not on file     Minutes per session: Not on file    Stress: Not on file   Relationships    Social connections:     Talks on phone: Not on file     Gets together: Not on file     Attends Yarsanism service: Not on file     Active member of club or organization: Not on file     Attends meetings of clubs or organizations: Not on file     Relationship status: Not on file   Other Topics Concern    Not on file   Social History Narrative    Not on file      Review of Systems   Constitutional: Positive for activity change, appetite change, chills, fatigue and fever. Negative for unexpected weight change.   HENT: Negative.    Eyes: Negative.    Respiratory: Negative.    Cardiovascular: Negative.    Gastrointestinal: Positive for abdominal pain, diarrhea, nausea and vomiting. Negative for abdominal distention and blood in stool.   Genitourinary:  "Negative for decreased urine volume.   Musculoskeletal: Negative for myalgias.   Skin: Negative.    Allergic/Immunologic: Negative for food allergies and immunocompromised state.   Neurological: Negative for dizziness, syncope and light-headedness.   Hematological: Negative.    Psychiatric/Behavioral: Negative.          Objective:     Body mass index is 19.01 kg/m².  /80 (BP Location: Right arm, Patient Position: Sitting, BP Method: Medium (Automatic))   Pulse 75   Temp 98.4 °F (36.9 °C)   Resp 16   Ht 5' 8" (1.727 m)   Wt 56.7 kg (125 lb)   SpO2 95%   BMI 19.01 kg/m²       Physical Exam   Constitutional: He is oriented to person, place, and time. He appears well-developed. He appears distressed.   Thin appearing male, appears ill/nauseous. Girlfriend with him.   HENT:   Head: Normocephalic and atraumatic.   Dry MM, JVP flat   Eyes: Conjunctivae are normal. No scleral icterus.   Cardiovascular: Normal rate, regular rhythm, normal heart sounds and intact distal pulses.   No murmur heard.  Pulmonary/Chest: Effort normal and breath sounds normal.   Abdominal: Soft. He exhibits no distension. There is tenderness. There is no rebound and no guarding.   Hyperactive bowel sounds, diffuse tenderness   Musculoskeletal: He exhibits no tenderness.   Lymphadenopathy:     He has no cervical adenopathy.   Neurological: He is alert and oriented to person, place, and time. No sensory deficit.   Skin: Skin is warm and dry. Capillary refill takes 2 to 3 seconds. He is not diaphoretic.   Psychiatric: He has a normal mood and affect. His behavior is normal.   Nursing note and vitals reviewed.      Office Visit on 12/18/2019   Component Date Value Ref Range Status    Influenza A, Molecular 12/18/2019 Negative  Negative Final    Influenza B, Molecular 12/18/2019 Negative  Negative Final    Flu A & B Source 12/18/2019 NP   Final   Lab Visit on 08/29/2019   Component Date Value Ref Range Status    HIV 1/2 Ag/Ab 08/29/2019 " Negative  Negative Final    Hepatitis B Surface Ag 08/29/2019 Negative   Final    Hep B C IgM 08/29/2019 Negative   Final    Hep A IgM 08/29/2019 Negative   Final    Hepatitis C Ab 08/29/2019 Negative   Final    HSV Ab, IgM by EIA 08/29/2019 Negative  Negative Final    Comment: -------------------ADDITIONAL INFORMATION-------------------  This test has been modified from the 's   instructions. Its performance characteristics were   determined by [facility] in a manner consistent with   CLIA requirements. This test has not been cleared or   approved by the U.S. Food and Drug Administration.  Test Performed by:  [facility]  [address] [place] [address]      HSV 1 IgG 08/29/2019 Negative  Negative Final    HSV 2 IgG 08/29/2019 Negative  Negative Final    RPR 08/29/2019 Non-reactive  Non-reactive Final   Office Visit on 08/29/2019   Component Date Value Ref Range Status    Specimen UA 08/29/2019 Urine, Clean Catch   Final    Color, UA 08/29/2019 Yellow  Yellow, Straw, Winsome Final    Appearance, UA 08/29/2019 Clear  Clear Final    pH, UA 08/29/2019 6.0  5.0 - 8.0 Final    Specific Gravity, UA 08/29/2019 1.025  1.005 - 1.030 Final    Protein, UA 08/29/2019 1+* Negative Final    Comment: Recommend a 24 hour urine protein or a urine   protein/creatinine ratio if globulin induced proteinuria is  clinically suspected.      Glucose, UA 08/29/2019 Negative  Negative Final    Ketones, UA 08/29/2019 Trace* Negative Final    Bilirubin (UA) 08/29/2019 Negative  Negative Final    Occult Blood UA 08/29/2019 Negative  Negative Final    Nitrite, UA 08/29/2019 Negative  Negative Final    Urobilinogen, UA 08/29/2019 CANCELED  EU/dL Final    Result canceled by the ancillary.    Leukocytes, UA 08/29/2019 Negative  Negative Final    Chlamydia, Amplified DNA 08/29/2019 Not Detected  Not Detected Final    N gonorrhoeae, amplified DNA 08/29/2019 Not  Detected  Not Detected Final    RBC, UA 08/29/2019 1  0 - 4 /hpf Final    WBC, UA 08/29/2019 3  0 - 5 /hpf Final    Bacteria 08/29/2019 Few* None-Occ /hpf Final    Squam Epithel,  08/29/2019 6  /hpf Final    Hyaline Casts,  08/29/2019 0  0-1/lpf /lpf Final    Microscopic Comment 08/29/2019 SEE COMMENT   Final    Comment: Other formed elements not mentioned in the report are not   present in the microscopic examination.        No results found in the last 24 hours.   Assessment:     Encounter Diagnoses   Name Primary?    Infectious diarrhea Yes    Flu-like symptoms     Body mass index (BMI) of 19.0 to 19.9 in adult         Plan:     #Infectious diarrhea  -poct influenza negative (initial symptoms flu like)  -appears dehydrated on exam, but hemodynamically stable.   -hyperactive bowel sounds. Likely food poisoning.   -flagyl + cipro. Counseled no alcohol.   -phenergan nausea. Lomotil diarrhea.   -excuse work today and tomorrow  -cbc, cmp, mg to check electrolytes.   -rest, hydration  -slowly advance diet.     #BMI 19.01  -noted.     #History of asthma as a child  -controlled. No rescue inhaler- declines.     #HCM  -8/29/19: HIV negative     Has mychart. Return for annual exam.       Griselda Rodriguez M.D.    Orders Placed This Encounter   Procedures    Influenza A & B by Molecular    CBC auto differential    Comprehensive metabolic panel    Magnesium      Medications Ordered This Encounter   Medications    ciprofloxacin HCl (CIPRO) 500 MG tablet     Sig: Take 1 tablet (500 mg total) by mouth 2 (two) times daily. for 5 days     Dispense:  10 tablet     Refill:  0    diphenoxylate-atropine 2.5-0.025 mg (LOMOTIL) 2.5-0.025 mg per tablet     Sig: Take 1 tablet by mouth 4 (four) times daily as needed for Diarrhea.     Dispense:  20 tablet     Refill:  0    metroNIDAZOLE (FLAGYL) 500 MG tablet     Sig: Take 1 tablet (500 mg total) by mouth 3 (three) times daily. for 5 days     Dispense:  15 tablet      Refill:  0    promethazine (PHENERGAN) 25 MG tablet     Sig: Take 1 tablet (25 mg total) by mouth every 6 (six) hours as needed for Nausea.     Dispense:  20 tablet     Refill:  0

## 2020-02-12 ENCOUNTER — TELEPHONE (OUTPATIENT)
Dept: FAMILY MEDICINE | Facility: CLINIC | Age: 30
End: 2020-02-12

## 2020-02-12 NOTE — TELEPHONE ENCOUNTER
He is not prescribed any ADHD medication.     Not sure if we can discuss with her given he is 29...

## 2020-02-12 NOTE — TELEPHONE ENCOUNTER
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 Mom can be reached at [phone]    Thanks,  Valerie Gomez

## 2020-02-12 NOTE — TELEPHONE ENCOUNTER
Patient was seen by Dr. Pruett while at [facility]. Patient was seen for ADHD and prescribed Adderall. Patient never followed up, but has only used medication when needed. Patient recently had taken medication and it has shown up on a drug screening for employment. Patient needs a letter from Dr. Pruett stating that this medication was prescribed and has no effects on the patient for driving. Please advise

## 2020-02-13 NOTE — TELEPHONE ENCOUNTER
Patient will need letter from clinic where medication was prescribed.   One of there providers can accomplish this.

## 2020-02-14 ENCOUNTER — OFFICE VISIT (OUTPATIENT)
Dept: FAMILY MEDICINE | Facility: CLINIC | Age: 30
End: 2020-02-14
Payer: COMMERCIAL

## 2020-02-14 VITALS
HEART RATE: 86 BPM | WEIGHT: 132 LBS | BODY MASS INDEX: 20 KG/M2 | SYSTOLIC BLOOD PRESSURE: 104 MMHG | TEMPERATURE: 98 F | DIASTOLIC BLOOD PRESSURE: 63 MMHG | HEIGHT: 68 IN

## 2020-02-14 DIAGNOSIS — Z13.6 ENCOUNTER FOR LIPID SCREENING FOR CARDIOVASCULAR DISEASE: ICD-10-CM

## 2020-02-14 DIAGNOSIS — Z79.899 ENCOUNTER FOR LONG-TERM (CURRENT) USE OF MEDICATIONS: Primary | ICD-10-CM

## 2020-02-14 DIAGNOSIS — F17.210 CIGARETTE SMOKER: ICD-10-CM

## 2020-02-14 DIAGNOSIS — Z13.220 ENCOUNTER FOR LIPID SCREENING FOR CARDIOVASCULAR DISEASE: ICD-10-CM

## 2020-02-14 DIAGNOSIS — F90.0 ATTENTION DEFICIT HYPERACTIVITY DISORDER (ADHD), PREDOMINANTLY INATTENTIVE TYPE: ICD-10-CM

## 2020-02-14 PROCEDURE — 99214 PR OFFICE/OUTPT VISIT, EST, LEVL IV, 30-39 MIN: ICD-10-PCS | Mod: S$GLB,,, | Performed by: FAMILY MEDICINE

## 2020-02-14 PROCEDURE — 99999 PR PBB SHADOW E&M-EST. PATIENT-LVL IV: CPT | Mod: PBBFAC,,, | Performed by: FAMILY MEDICINE

## 2020-02-14 PROCEDURE — 3008F BODY MASS INDEX DOCD: CPT | Mod: CPTII,S$GLB,, | Performed by: FAMILY MEDICINE

## 2020-02-14 PROCEDURE — 99214 OFFICE O/P EST MOD 30 MIN: CPT | Mod: S$GLB,,, | Performed by: FAMILY MEDICINE

## 2020-02-14 PROCEDURE — 3008F PR BODY MASS INDEX (BMI) DOCUMENTED: ICD-10-PCS | Mod: CPTII,S$GLB,, | Performed by: FAMILY MEDICINE

## 2020-02-14 PROCEDURE — 99999 PR PBB SHADOW E&M-EST. PATIENT-LVL IV: ICD-10-PCS | Mod: PBBFAC,,, | Performed by: FAMILY MEDICINE

## 2020-02-14 RX ORDER — DEXTROAMPHETAMINE SACCHARATE, AMPHETAMINE ASPARTATE, DEXTROAMPHETAMINE SULFATE AND AMPHETAMINE SULFATE 2.5; 2.5; 2.5; 2.5 MG/1; MG/1; MG/1; MG/1
10 TABLET ORAL DAILY
COMMUNITY
End: 2020-02-14 | Stop reason: SDUPTHER

## 2020-02-14 RX ORDER — DEXTROAMPHETAMINE SACCHARATE, AMPHETAMINE ASPARTATE, DEXTROAMPHETAMINE SULFATE AND AMPHETAMINE SULFATE 2.5; 2.5; 2.5; 2.5 MG/1; MG/1; MG/1; MG/1
10 TABLET ORAL DAILY
Qty: 30 TABLET | Refills: 0 | Status: SHIPPED | OUTPATIENT
Start: 2020-04-16 | End: 2020-05-14 | Stop reason: SDUPTHER

## 2020-02-14 RX ORDER — DEXTROAMPHETAMINE SACCHARATE, AMPHETAMINE ASPARTATE, DEXTROAMPHETAMINE SULFATE AND AMPHETAMINE SULFATE 2.5; 2.5; 2.5; 2.5 MG/1; MG/1; MG/1; MG/1
10 TABLET ORAL DAILY
Qty: 30 TABLET | Refills: 0 | Status: SHIPPED | OUTPATIENT
Start: 2020-02-15 | End: 2020-05-14 | Stop reason: SDUPTHER

## 2020-02-14 RX ORDER — DEXTROAMPHETAMINE SACCHARATE, AMPHETAMINE ASPARTATE, DEXTROAMPHETAMINE SULFATE AND AMPHETAMINE SULFATE 2.5; 2.5; 2.5; 2.5 MG/1; MG/1; MG/1; MG/1
10 TABLET ORAL DAILY
Qty: 30 TABLET | Refills: 0 | Status: SHIPPED | OUTPATIENT
Start: 2020-02-14 | End: 2020-05-14 | Stop reason: SDUPTHER

## 2020-02-14 NOTE — PROGRESS NOTES
======================================================  GOAL of current visit: Est Care/Refill ADHD Medications    Previous PCP: Dr. Sixto Pruett [facility]  Specialists:   who performed ADHD eval in [place]  Recent lab work:  None  Recent imaging:  None  Colonoscopy History:  Not indicated    Health Maintenance Due   Topic Date Due    Lipid Panel  1992    Pneumococcal Vaccine (Medium Risk) (1 of 1 - PPSV23) 04/12/2011    TETANUS VACCINE  04/02/2019     ======================================================  PLAN:      Problem List Items Addressed This Visit     Encounter for long-term (current) use of medications - Primary (Chronic)     Complete history and physical was completed today.  Complete and thorough medication reconciliation was performed.  Discussed risks and benefits of medications.  Advised patient on orders and health maintenance.  We discussed old records and old labs if available.  Will request any records not available through epic.  Continue current medications listed on your summary sheet.           Relevant Medications    dextroamphetamine-amphetamine (ADDERALL) 10 mg Tab    dextroamphetamine-amphetamine 10 mg Tab (Start on 2/15/2020)    dextroamphetamine-amphetamine 10 mg Tab (Start on 4/16/2020)    Other Relevant Orders    Ambulatory referral/consult to Smoking Cessation Program    Lipid panel    CBC Without Differential    Comprehensive metabolic panel    TSH    Attention deficit hyperactivity disorder (ADHD), predominantly inattentive type (Chronic)     Restart Adderall 10 mg.  Virtual visit in three months.  Patient have labs done before follow-up.  Discussed risks and benefits of medication use.    The patient was checked in the [place] Board of Pharmacy's  Prescription Monitoring Program. There appears to be no incongruities with the patient's verbalized history.            Relevant Medications    dextroamphetamine-amphetamine (ADDERALL) 10 mg Tab     dextroamphetamine-amphetamine 10 mg Tab (Start on 2/15/2020)    dextroamphetamine-amphetamine 10 mg Tab (Start on 4/16/2020)    Cigarette smoker     Smoking cessation referral.  Patient advised risk of continuing smoking.  Patient understood.  All questions answered.         Relevant Orders    Ambulatory referral/consult to Smoking Cessation Program    Encounter for lipid screening for cardiovascular disease    Relevant Orders    Lipid panel        Future Appointments     Date Provider Specialty Appt Notes    5/14/2020 Reginald Best MD Family Medicine fu 3 mo           Medication List with Changes/Refills   New Medications    DEXTROAMPHETAMINE-AMPHETAMINE 10 MG TAB    Take 1 tablet (10 mg total) by mouth once daily.    DEXTROAMPHETAMINE-AMPHETAMINE 10 MG TAB    Take 1 tablet (10 mg total) by mouth once daily.   Changed and/or Refilled Medications    Modified Medication Previous Medication    DEXTROAMPHETAMINE-AMPHETAMINE (ADDERALL) 10 MG TAB dextroamphetamine-amphetamine (ADDERALL) 10 mg Tab       Take 1 tablet (10 mg total) by mouth once daily.    Take 10 mg by mouth once daily.   Discontinued Medications    PROMETHAZINE (PHENERGAN) 25 MG TABLET    Take 1 tablet (25 mg total) by mouth every 6 (six) hours as needed for Nausea.       Reginald Best M.D.     ==========================================================================  Subjective:      Patient ID: Jayjay Monge is a 29 y.o. male.  has a past medical history of ADHD (attention deficit hyperactivity disorder) and Asthma.     Chief Complaint: Establish Care (refill ADHD medications)      Problem List Items Addressed This Visit     Encounter for long-term (current) use of medications - Primary (Chronic)    Overview     CHRONIC. Stable. Compliant with medications for managed conditions. See medication list. No SE reported.   Routine lab analysis is being monitored. Refills were addressed.  Lab Results   Component Value Date    WBC 5.17  11/19/2015    HGB 15.3 11/19/2015    HCT 44.8 11/19/2015    MCV 90 11/19/2015     11/19/2015         Chemistry        Component Value Date/Time     12/24/2012 1102    K 3.7 12/24/2012 1102     12/24/2012 1102    CO2 25 12/24/2012 1102    BUN 5 (L) 12/24/2012 1102    CREATININE 0.9 12/24/2012 1102    GLU 98 12/24/2012 1102        Component Value Date/Time    CALCIUM 9.1 12/24/2012 1102    ALKPHOS 58 12/24/2012 1102    AST 15 12/24/2012 1102    ALT 16 12/24/2012 1102    BILITOT 0.5 12/24/2012 1102    ESTGFRAFRICA >60 12/24/2012 1102    EGFRNONAA >60 12/24/2012 1102          Lab Results   Component Value Date    TSH 0.537 12/22/2010              Current Assessment & Plan     Complete history and physical was completed today.  Complete and thorough medication reconciliation was performed.  Discussed risks and benefits of medications.  Advised patient on orders and health maintenance.  We discussed old records and old labs if available.  Will request any records not available through epic.  Continue current medications listed on your summary sheet.           Attention deficit hyperactivity disorder (ADHD), predominantly inattentive type (Chronic)    Overview     Chronic.  Patient has had ADHD since childhood.  Patient has been on Adderall 10 mg daily for many years.  Was restarted on medication in 2018 by Dr. Sixto Pruett in [place].  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  Patient reports that he is now back in school and is pending examination is but is having trouble with his attention.  Patient brought records confirming previous ADHD evaluation using validated scale which will be scanned into the chart.    He reports that his current treatment is providing satisfactory relief of his attention deficit disorder symptoms and helping him compensate for his deficits in school and at work. He reports that he is tolerating his current treatment well.  He reports no mood instability, tics, or disordered sleep, or other apparent adverse effects. He is demonstrating no behaviors to suggest inappropriate use of prescribed medications. [place] Board of Pharmacy Controlled Prescription Drug Monitoring database was queried and showed no activity to suggest abuse, diversion, or other inappropriate use of prescription medications.         Current Assessment & Plan     Restart Adderall 10 mg.  Virtual visit in three months.  Patient have labs done before follow-up.  Discussed risks and benefits of medication use.    The patient was checked in the [place] Board of Pharmacy's  Prescription Monitoring Program. There appears to be no incongruities with the patient's verbalized history.            Cigarette smoker    Overview     Assistance with smoking cessation was offered, including:  [x]  Medications  [x]  Counseling  [x]  Printed Information on Smoking Cessation  [x]  Referral to a Smoking Cessation Program    Patient was counseled regarding smoking for 3-10 minutes.             Current Assessment & Plan     Smoking cessation referral.  Patient advised risk of continuing smoking.  Patient understood.  All questions answered.         Encounter for lipid screening for cardiovascular disease           Past Medical History:  Past Medical History:   Diagnosis Date    ADHD (attention deficit hyperactivity disorder)     Asthma      Past Surgical History:   Procedure Laterality Date    NOSE SURGERY       Review of patient's allergies indicates:   Allergen Reactions    Zithromax [azithromycin] Hives     Medication List with Changes/Refills   New Medications    DEXTROAMPHETAMINE-AMPHETAMINE 10 MG TAB    Take 1 tablet (10 mg total) by mouth once daily.    DEXTROAMPHETAMINE-AMPHETAMINE 10 MG TAB    Take 1 tablet (10 mg total) by mouth once daily.   Changed and/or Refilled Medications    Modified Medication Previous Medication    DEXTROAMPHETAMINE-AMPHETAMINE (ADDERALL)  "10 MG TAB dextroamphetamine-amphetamine (ADDERALL) 10 mg Tab       Take 1 tablet (10 mg total) by mouth once daily.    Take 10 mg by mouth once daily.   Discontinued Medications    PROMETHAZINE (PHENERGAN) 25 MG TABLET    Take 1 tablet (25 mg total) by mouth every 6 (six) hours as needed for Nausea.      Social History     Tobacco Use    Smoking status: Current Every Day Smoker     Packs/day: 0.25     Years: 10.00     Pack years: 2.50     Types: Cigarettes     Start date: 4/12/2009    Smokeless tobacco: Never Used   Substance Use Topics    Alcohol use: Yes     Frequency: Monthly or less     Drinks per session: 1 or 2     Binge frequency: Never      Family History   Problem Relation Age of Onset    Mitral valve prolapse Mother     ADD / ADHD Mother     Diabetes Father     Heart disease Father        I have reviewed the complete PMH, social history, surgical history, allergies and medications.  As well as family history.    Review of Systems   Constitutional: Negative for activity change and fatigue.   HENT: Negative for congestion and sinus pain.    Eyes: Negative for visual disturbance.   Respiratory: Negative for chest tightness and shortness of breath.    Cardiovascular: Negative for palpitations and leg swelling.   Gastrointestinal: Negative for abdominal pain, diarrhea and nausea.   Endocrine: Negative for polyuria.   Genitourinary: Negative for difficulty urinating and frequency.   Musculoskeletal: Negative for arthralgias and joint swelling.   Skin: Negative for rash.   Neurological: Negative for dizziness and headaches.   Psychiatric/Behavioral: Positive for agitation and decreased concentration. The patient is not nervous/anxious.      Objective:   /63   Pulse 86   Temp 98.3 °F (36.8 °C) (Oral)   Ht 5' 8" (1.727 m)   Wt 59.9 kg (132 lb)   BMI 20.07 kg/m²   Physical Exam   Constitutional: He is oriented to person, place, and time. He appears well-developed and well-nourished. No distress. "   HENT:   Head: Normocephalic and atraumatic.   Eyes: Pupils are equal, round, and reactive to light. EOM are normal.   Neck: Normal range of motion. Neck supple.   Cardiovascular: Normal rate, regular rhythm, normal heart sounds and intact distal pulses.   No murmur heard.  Pulmonary/Chest: Effort normal. No respiratory distress. He has decreased breath sounds. He has no wheezes.   Abdominal: Soft. Bowel sounds are normal.   Musculoskeletal: Normal range of motion. He exhibits no edema.   Neurological: He is alert and oriented to person, place, and time. No cranial nerve deficit.   Skin: Skin is warm and dry. Capillary refill takes less than 2 seconds.   Psychiatric: He has a normal mood and affect. His speech is normal and behavior is normal. Judgment and thought content normal. Cognition and memory are normal. He is inattentive.   Nursing note and vitals reviewed.      Assessment:     1. Encounter for long-term (current) use of medications    2. Attention deficit hyperactivity disorder (ADHD), predominantly inattentive type    3. Cigarette smoker    4. Encounter for lipid screening for cardiovascular disease      MDM:   New patient.  Moderate risk.  New prescriptions today.  I have Reviewed and summarized old records.  [facility]  I have performed thorough medication reconciliation today and discussed risk and benefits of each medication.  I have reviewed labs and discussed with patient.  All questions were answered.  I am requesting old records and will review them once they are available.     I have signed for the following orders AND/OR meds.  Orders Placed This Encounter   Procedures    Lipid panel     Standing Status:   Future     Standing Expiration Date:   4/14/2021    CBC Without Differential     Standing Status:   Future     Standing Expiration Date:   4/14/2021    Comprehensive metabolic panel     Standing Status:   Future     Standing Expiration Date:   4/14/2021    TSH     Standing  Status:   Future     Standing Expiration Date:   4/14/2021    Ambulatory referral/consult to Smoking Cessation Program     Standing Status:   Future     Standing Expiration Date:   3/14/2021     Referral Priority:   Routine     Referral Type:   Consultation     Referral Reason:   Specialty Services Required     Requested Specialty:   CTTS     Number of Visits Requested:   1     Medications Ordered This Encounter   Medications    dextroamphetamine-amphetamine (ADDERALL) 10 mg Tab     Sig: Take 1 tablet (10 mg total) by mouth once daily.     Dispense:  30 tablet     Refill:  0    dextroamphetamine-amphetamine 10 mg Tab     Sig: Take 1 tablet (10 mg total) by mouth once daily.     Dispense:  30 tablet     Refill:  0    dextroamphetamine-amphetamine 10 mg Tab     Sig: Take 1 tablet (10 mg total) by mouth once daily.     Dispense:  30 tablet     Refill:  0        Follow up in about 3 months (around 5/14/2020), or if symptoms worsen or fail to improve, for Med refills, ADHD.    If no improvement in symptoms or symptoms worsen, advised to call/follow-up at clinic or go to ER. Patient voiced understanding and all questions/concerns were addressed.     DISCLAIMER: This note was compiled by using a speech recognition dictation system and therefore please be aware that typographical / speech recognition errors can and do occur.  Please contact me if you see any errors specifically.    Reginald Best M.D.       Office: [phone]   [address] [place] [address]  FAX: [phone]

## 2020-02-14 NOTE — ASSESSMENT & PLAN NOTE
Restart Adderall 10 mg.  Virtual visit in three months.  Patient have labs done before follow-up.  Discussed risks and benefits of medication use.    The patient was checked in the [facility] Board of Pharmacy's  Prescription Monitoring Program. There appears to be no incongruities with the patient's verbalized history.

## 2020-02-14 NOTE — LETTER
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  I believe the nature and severity of the medical condition of the  does not endanger the health and safety of the public.  The patient is compliant with treatment and tolerates the medication without side effects.  Patient is currently taking Adderall 10 mg, he takes one tablet by mouth daily for ADHD.      If you have any questions or concerns, please don't hesitate to call my staff @ [phone].      Sincerely,          Reginald Best M.D.

## 2020-02-14 NOTE — PATIENT INSTRUCTIONS
Follow up in about 3 months (around 5/14/2020), or if symptoms worsen or fail to improve, for Med refills, ADHD.     If no improvement in symptoms or symptoms worsen, please be advised to call MD, follow-up at clinic and/or go to ER if becomes severe.    Reginald Best M.D.        We Offer TELEHEALTH & Same Day Appointments!   Book your Telehealth appointment with me through my nurse or   Clinic appointments on [facility]!    [address] [place] [address]    Office: [phone]   FAX: [phone]    Check out my Facebook Page and Follow Me at: [email or web address]    Check out my website at [facility] by clicking on: [email or web address]    To Schedule appointments online, go to [facility]: [email or web address]       Telemedicine Virtual Visits    What is a Virtual Visit?  A virtual visit is a secure video appointment with your provider via your smartphone or tablet.  This allows you to conduct a traditional office visit with your provider electronically through your [employer] juan manuel without leaving home or work.    How much is a Virtual Visit?  If you have health insurance, your insurance will be billed for the virtual visit. If your insurance does not cover a virtual visit (or if you do not have health insurance), you will be responsible for a $54 fee. If your insurance covers the virtual visit, you will be responsible for your co-pay, like when you come in for an office visit. (More and more insurances are covering virtual visits. If you have questions about your insurance coverage for virtual visits, you will need to contact your health insurance provider.)    Preparing for your Virtual Visit  · You must have a mobile phone or tablet with iOS or Android operating system.  · Your device must have a front-facing camera.  · You must have the [facility] juan manuel installed and [facility] selected as your healthcare  "provider.  · You can find the Wanelo jacqueline in the Jacqueline Store (iPhone) and Google Play Store (Android).  · If you need help with Wanelo, help is available:  · online at [email or web address]   at the O-bar in the 1st floor lobby of [facility]  · or by phone at [phone]    E-Pre-Check  · Once your Virtual Visit is scheduled you will need to complete the ePre-Check in the Wanelo jacqueline before your visit.   · During ePre-Check you will verify your insurance, demographics and sign the Telehealth Consent form.        How to Start Your Virtual Visit  Once your ePre-check is complete, you will need to test your hardware prior to your scheduled appointment.    1. Select the box that has your upcoming Virtual Visit appointment.  2.  On the next screen select the 'Test Video' button on the bottom of the screen.  3.  If successful, you will receive a pop-up saying "You're all set."          Ready for your Virtual Visit Appointment   · Select 'Appointments' from the [facility]elo home screen.  · Find and select today's Virtual Visit Appointment.  · Select 'Begin Visit'. Once selected you will enter a virtual visit waiting room until your provider arrives.        "

## 2020-02-14 NOTE — ASSESSMENT & PLAN NOTE
Smoking cessation referral.  Patient advised risk of continuing smoking.  Patient understood.  All questions answered.

## 2020-03-24 ENCOUNTER — TELEPHONE (OUTPATIENT)
Dept: FAMILY MEDICINE | Facility: CLINIC | Age: 30
End: 2020-03-24

## 2020-03-24 NOTE — TELEPHONE ENCOUNTER
Please advise for the patient to go into self isolation for two weeks.  Monitor for any symptoms and notify us if having any of the symptoms of COVID 19.  Check temperature rarely.  Stay hydrated and rest.  Avoid contact with anyone at this point.  ER precautions.  Patient is not on controller medication and reported not using his albuterol inhaler for asthma therefore he does not have a significant increase in risk but should still take these precautions seriously.    You can offer a virtual visit if needed.

## 2020-03-24 NOTE — TELEPHONE ENCOUNTER
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  Patient had another  riding with him last week that is now in the hospital on a ventilator with positive Covid 19 results.  Mom states that they have started checking patient's temp daily and has not developed a temp nor has he developed any shortness of breath or cold symptoms and does not have a cough.  Patient's mom was advised to have patient get in touch with his HR department as well as his employee wellness department regarding this and bridget I would send this message to Dr. Best for review.  Please advise.

## 2020-03-24 NOTE — TELEPHONE ENCOUNTER
----- Message from Luis Taylor sent at 3/24/2020 12:21 PM CDT -----  Contact: Pt mother Yeni  Pt mother Yeni called and would like to know if the pt asthma is considered a weakened immune system and does it make him more susceptible ?    Are they any precautions that the pt should take?    Yeni can be reached at [phone]

## 2020-03-24 NOTE — TELEPHONE ENCOUNTER
Called and spoke with the patient's mom, patient's mom notified of Dr. Best's response and recommendations, patient's mom verbalized understanding of this.

## 2020-04-14 ENCOUNTER — PATIENT MESSAGE (OUTPATIENT)
Dept: FAMILY MEDICINE | Facility: CLINIC | Age: 30
End: 2020-04-14

## 2020-04-15 ENCOUNTER — TELEPHONE (OUTPATIENT)
Dept: FAMILY MEDICINE | Facility: CLINIC | Age: 30
End: 2020-04-15

## 2020-04-15 ENCOUNTER — OFFICE VISIT (OUTPATIENT)
Dept: FAMILY MEDICINE | Facility: CLINIC | Age: 30
End: 2020-04-15
Payer: COMMERCIAL

## 2020-04-15 DIAGNOSIS — Z79.899 ENCOUNTER FOR MEDICATION REVIEW: Primary | ICD-10-CM

## 2020-04-15 DIAGNOSIS — J45.909 ASTHMA, UNSPECIFIED ASTHMA SEVERITY, UNSPECIFIED WHETHER COMPLICATED, UNSPECIFIED WHETHER PERSISTENT: ICD-10-CM

## 2020-04-15 PROBLEM — J45.20 MILD INTERMITTENT ASTHMA WITHOUT COMPLICATION: Status: ACTIVE | Noted: 2020-04-15

## 2020-04-15 PROCEDURE — 99213 OFFICE O/P EST LOW 20 MIN: CPT | Mod: 95,,, | Performed by: NURSE PRACTITIONER

## 2020-04-15 PROCEDURE — 99213 PR OFFICE/OUTPT VISIT, EST, LEVL III, 20-29 MIN: ICD-10-PCS | Mod: 95,,, | Performed by: NURSE PRACTITIONER

## 2020-04-15 RX ORDER — ALBUTEROL SULFATE 90 UG/1
2 AEROSOL, METERED RESPIRATORY (INHALATION) EVERY 6 HOURS PRN
COMMUNITY
End: 2020-11-25

## 2020-04-15 NOTE — PROGRESS NOTES
Subjective:       Patient ID: Jayjay Monge is a 30 y.o. male.    Chief Complaint: No chief complaint on file.  The patient location is: Pt's work vehicle  The chief complaint leading to consultation is: Asthma, needs letter for job  Visit type: audiovisual  Total time spent with patient: 10 min  Each patient to whom he or she provides medical services by telemedicine is:  (1) informed of the relationship between the physician and patient and the respective role of any other health care provider with respect to management of the patient; and (2) notified that he or she may decline to receive medical services by telemedicine and may withdraw from such care at any time.    Pt scheduled telehealth visit today for asthma. Pt denies any asthma exacerbation at present; uses albuterol as needed. Pt states works for [employer] and needs letter stating that he has asthma and is high risk for COVID-19. Pt informed nurse will inform when letter is ready for pick-up. Pt has no other complaints today.  Past Medical History:   Diagnosis Date    ADHD (attention deficit hyperactivity disorder)     Asthma      Social History     Socioeconomic History    Marital status: Single     Spouse name: Not on file    Number of children: Not on file    Years of education: Not on file    Highest education level: Not on file   Occupational History     Employer: [employer]   Social Needs    Financial resource strain: Not very hard    Food insecurity:     Worry: Never true     Inability: Never true    Transportation needs:     Medical: No     Non-medical: No   Tobacco Use    Smoking status: Current Every Day Smoker     Packs/day: 0.25     Years: 10.00     Pack years: 2.50     Types: Cigarettes     Start date: 4/12/2009    Smokeless tobacco: Never Used   Substance and Sexual Activity    Alcohol use: Yes     Frequency: Monthly or less     Drinks per session: 1 or 2     Binge frequency: Never    Drug  use: No    Sexual activity: Yes     Partners: Female     Birth control/protection: Condom   Lifestyle    Physical activity:     Days per week: 5 days     Minutes per session: 30 min    Stress: Only a little   Relationships    Social connections:     Talks on phone: More than three times a week     Gets together: More than three times a week     Attends Orthodoxy service: More than 4 times per year     Active member of club or organization: No     Attends meetings of clubs or organizations: Never     Relationship status: Never    Other Topics Concern    Not on file   Social History Narrative    Not on file     Past Surgical History:   Procedure Laterality Date    NOSE SURGERY           HPI  Review of Systems    Objective:      Physical Exam    Alert and oriented, appropriate affect Behavior: normal Speech: appropriate quality, quantity and organization of sentences Thought content: normal and Affect: euthymic           Assessment:       1. Encounter for medication review    2. Asthma, unspecified asthma severity, unspecified whether complicated, unspecified whether persistent       Plan:           Diagnoses and all orders for this visit:    Encounter for medication review  Asthma, unspecified asthma severity, unspecified whether complicated, unspecified whether persistent  Continue current medications  Letter written

## 2020-05-14 ENCOUNTER — OFFICE VISIT (OUTPATIENT)
Dept: FAMILY MEDICINE | Facility: CLINIC | Age: 30
End: 2020-05-14
Payer: COMMERCIAL

## 2020-05-14 DIAGNOSIS — F41.9 ANXIETY: ICD-10-CM

## 2020-05-14 DIAGNOSIS — F90.0 ATTENTION DEFICIT HYPERACTIVITY DISORDER (ADHD), PREDOMINANTLY INATTENTIVE TYPE: ICD-10-CM

## 2020-05-14 DIAGNOSIS — Z79.899 ENCOUNTER FOR LONG-TERM (CURRENT) USE OF MEDICATIONS: Primary | ICD-10-CM

## 2020-05-14 PROCEDURE — 99213 PR OFFICE/OUTPT VISIT, EST, LEVL III, 20-29 MIN: ICD-10-PCS | Mod: 95,,, | Performed by: FAMILY MEDICINE

## 2020-05-14 PROCEDURE — 99213 OFFICE O/P EST LOW 20 MIN: CPT | Mod: 95,,, | Performed by: FAMILY MEDICINE

## 2020-05-14 RX ORDER — DEXTROAMPHETAMINE SACCHARATE, AMPHETAMINE ASPARTATE, DEXTROAMPHETAMINE SULFATE AND AMPHETAMINE SULFATE 2.5; 2.5; 2.5; 2.5 MG/1; MG/1; MG/1; MG/1
10 TABLET ORAL DAILY
Qty: 30 TABLET | Refills: 0 | Status: SHIPPED | OUTPATIENT
Start: 2020-05-14 | End: 2020-06-13

## 2020-05-14 RX ORDER — DEXTROAMPHETAMINE SACCHARATE, AMPHETAMINE ASPARTATE MONOHYDRATE, DEXTROAMPHETAMINE SULFATE AND AMPHETAMINE SULFATE 2.5; 2.5; 2.5; 2.5 MG/1; MG/1; MG/1; MG/1
10 CAPSULE, EXTENDED RELEASE ORAL
COMMUNITY
End: 2020-05-14

## 2020-05-14 RX ORDER — DEXTROAMPHETAMINE SACCHARATE, AMPHETAMINE ASPARTATE, DEXTROAMPHETAMINE SULFATE AND AMPHETAMINE SULFATE 2.5; 2.5; 2.5; 2.5 MG/1; MG/1; MG/1; MG/1
10 TABLET ORAL DAILY
Qty: 30 TABLET | Refills: 0 | Status: SHIPPED | OUTPATIENT
Start: 2020-05-14 | End: 2020-07-30 | Stop reason: SDUPTHER

## 2020-05-14 RX ORDER — HYDROXYZINE HYDROCHLORIDE 25 MG/1
50 TABLET, FILM COATED ORAL NIGHTLY PRN
Qty: 60 TABLET | Refills: 0 | Status: SHIPPED | OUTPATIENT
Start: 2020-05-14 | End: 2020-06-05

## 2020-05-14 NOTE — PROGRESS NOTES
Primary Care Telemedicine Note    The patient location is:  Patient Home - [place]  The chief complaint leading to consultation is:   Chief Complaint   Patient presents with    Medication Refill    Anxiety      Total time spent with patient:  11 min    Visit type: Virtual visit with synchronous audio only and video  Each patient to whom he or she provides medical services by telemedicine is:  (1) informed of the relationship between the physician and patient and the respective role of any other health care provider with respect to management of the patient; and (2) notified that he or she may decline to receive medical services by telemedicine and may withdraw from such care at any time.  =================================================================  PLAN:      Problem List Items Addressed This Visit     Encounter for long-term (current) use of medications - Primary (Chronic)    Relevant Medications    dextroamphetamine-amphetamine (ADDERALL) 10 mg Tab    dextroamphetamine-amphetamine 10 mg Tab    dextroamphetamine-amphetamine 10 mg Tab    Attention deficit hyperactivity disorder (ADHD), predominantly inattentive type (Chronic)     Refill Adderall for three months.  Follow-up virtual visit in three months.         Relevant Medications    dextroamphetamine-amphetamine (ADDERALL) 10 mg Tab    dextroamphetamine-amphetamine 10 mg Tab    dextroamphetamine-amphetamine 10 mg Tab    Anxiety (Chronic)     Will avoid Xanax as patient is already on controlled substance for ADHD.  Start with hydroxyzine as needed at bedtime for anxiety and panic attacks.  If this fails may try BuSpar verses SSRI.  Discussed anxiety condition course.  Discussed SSRI as first-line treatment for this condition.  Discussed risk of discontinuing this medication without tapering.  Patient was educated, advised of side effects, and all questions were answered.  Patient voiced understanding.  Patient will follow up routinely and notify us if  having any side effects or worsening or persistent symptoms.  ER precautions were given.           Relevant Medications    hydroxyzine HCL (ATARAX) 25 MG tablet             Medication List with Changes/Refills   New Medications    HYDROXYZINE HCL (ATARAX) 25 MG TABLET    Take 2 tablets (50 mg total) by mouth nightly as needed for Anxiety.   Current Medications    ALBUTEROL (PROVENTIL/VENTOLIN HFA) 90 MCG/ACTUATION INHALER    Inhale 2 puffs into the lungs every 6 (six) hours as needed. Rescue   Changed and/or Refilled Medications    Modified Medication Previous Medication    DEXTROAMPHETAMINE-AMPHETAMINE (ADDERALL) 10 MG TAB dextroamphetamine-amphetamine (ADDERALL) 10 mg Tab       Take 1 tablet (10 mg total) by mouth once daily.    Take 1 tablet (10 mg total) by mouth once daily.    DEXTROAMPHETAMINE-AMPHETAMINE 10 MG TAB dextroamphetamine-amphetamine 10 mg Tab       Take 1 tablet (10 mg total) by mouth once daily.    Take 1 tablet (10 mg total) by mouth once daily.    DEXTROAMPHETAMINE-AMPHETAMINE 10 MG TAB dextroamphetamine-amphetamine 10 mg Tab       Take 1 tablet (10 mg total) by mouth once daily.    Take 1 tablet (10 mg total) by mouth once daily.   Discontinued Medications    DEXTROAMPHETAMINE-AMPHETAMINE (ADDERALL XR) 10 MG 24 HR CAPSULE    Take 10 mg by mouth.       Reginald Best M.D.     ==========================================================================  Subjective:      Patient ID: Jayjay Monge is a 30 y.o. male.  has a past medical history of ADHD (attention deficit hyperactivity disorder) and Asthma.     Chief Complaint: Medication Refill and Anxiety      Problem List Items Addressed This Visit     Encounter for long-term (current) use of medications - Primary (Chronic)    Overview     CHRONIC. Stable. Compliant with medications for managed conditions. See medication list. No SE reported.   Routine lab analysis is being monitored. Refills were addressed.  Lab Results   Component  Value Date    WBC 5.17 11/19/2015    HGB 15.3 11/19/2015    HCT 44.8 11/19/2015    MCV 90 11/19/2015     11/19/2015         Chemistry        Component Value Date/Time     12/24/2012 1102    K 3.7 12/24/2012 1102     12/24/2012 1102    CO2 25 12/24/2012 1102    BUN 5 (L) 12/24/2012 1102    CREATININE 0.9 12/24/2012 1102    GLU 98 12/24/2012 1102        Component Value Date/Time    CALCIUM 9.1 12/24/2012 1102    ALKPHOS 58 12/24/2012 1102    AST 15 12/24/2012 1102    ALT 16 12/24/2012 1102    BILITOT 0.5 12/24/2012 1102    ESTGFRAFRICA >60 12/24/2012 1102    EGFRNONAA >60 12/24/2012 1102          Lab Results   Component Value Date    TSH 0.537 12/22/2010              Attention deficit hyperactivity disorder (ADHD), predominantly inattentive type (Chronic)    Overview     =======================================================  MAY 2020:  Patient reports Adderall is working well at this time.  Patient here for refill.  He does report that he is having trouble sleeping with this is due to his anxiety.  See problem.  ======================================================  Chronic.  Patient has had ADHD since childhood.  Patient has been on Adderall 10 mg daily for many years.  Was restarted on medication in 2018 by Dr. Sixto Pruett in [place].  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  Patient reports that he is now back in school and is pending examination is but is having trouble with his attention.  Patient brought records confirming previous ADHD evaluation using validated scale which will be scanned into the chart.    He reports that his current treatment is providing satisfactory relief of his attention deficit disorder symptoms and helping him compensate for his deficits in school and at work. He reports that he is tolerating his current treatment well. He reports no mood instability, tics, or disordered sleep, or other apparent adverse effects.  He is demonstrating no behaviors to suggest inappropriate use of prescribed medications. [place] Board of Pharmacy Controlled Prescription Drug Monitoring database was queried and showed no activity to suggest abuse, diversion, or other inappropriate use of prescription medications.         Current Assessment & Plan     Refill Adderall for three months.  Follow-up virtual visit in three months.         Anxiety (Chronic)    Overview     New problem.  Subacute.  Started during COVID-19 outbreak.  Patient has had several friends test positive for COVID an are very ill.  Patient is very concerned about his asthma and catching the virus.  Patient reports that he is not able to sleep at night.  Patient has had anxiety that was treated with Xanax previously.         Current Assessment & Plan     Will avoid Xanax as patient is already on controlled substance for ADHD.  Start with hydroxyzine as needed at bedtime for anxiety and panic attacks.  If this fails may try BuSpar verses SSRI.  Discussed anxiety condition course.  Discussed SSRI as first-line treatment for this condition.  Discussed risk of discontinuing this medication without tapering.  Patient was educated, advised of side effects, and all questions were answered.  Patient voiced understanding.  Patient will follow up routinely and notify us if having any side effects or worsening or persistent symptoms.  ER precautions were given.                  Past Medical History:  Past Medical History:   Diagnosis Date    ADHD (attention deficit hyperactivity disorder)     Asthma      Past Surgical History:   Procedure Laterality Date    NOSE SURGERY       Review of patient's allergies indicates:   Allergen Reactions    Erythromycin Hives    Zithromax [azithromycin] Hives     Medication List with Changes/Refills   New Medications    HYDROXYZINE HCL (ATARAX) 25 MG TABLET    Take 2 tablets (50 mg total) by mouth nightly as needed for Anxiety.   Current Medications     ALBUTEROL (PROVENTIL/VENTOLIN HFA) 90 MCG/ACTUATION INHALER    Inhale 2 puffs into the lungs every 6 (six) hours as needed. Rescue   Changed and/or Refilled Medications    Modified Medication Previous Medication    DEXTROAMPHETAMINE-AMPHETAMINE (ADDERALL) 10 MG TAB dextroamphetamine-amphetamine (ADDERALL) 10 mg Tab       Take 1 tablet (10 mg total) by mouth once daily.    Take 1 tablet (10 mg total) by mouth once daily.    DEXTROAMPHETAMINE-AMPHETAMINE 10 MG TAB dextroamphetamine-amphetamine 10 mg Tab       Take 1 tablet (10 mg total) by mouth once daily.    Take 1 tablet (10 mg total) by mouth once daily.    DEXTROAMPHETAMINE-AMPHETAMINE 10 MG TAB dextroamphetamine-amphetamine 10 mg Tab       Take 1 tablet (10 mg total) by mouth once daily.    Take 1 tablet (10 mg total) by mouth once daily.   Discontinued Medications    DEXTROAMPHETAMINE-AMPHETAMINE (ADDERALL XR) 10 MG 24 HR CAPSULE    Take 10 mg by mouth.      Social History     Tobacco Use    Smoking status: Current Every Day Smoker     Packs/day: 0.25     Years: 10.00     Pack years: 2.50     Types: Cigarettes     Start date: 4/12/2009    Smokeless tobacco: Never Used   Substance Use Topics    Alcohol use: Yes     Frequency: Monthly or less     Drinks per session: 1 or 2     Binge frequency: Never      Family History   Problem Relation Age of Onset    Mitral valve prolapse Mother     ADD / ADHD Mother     Diabetes Father     Heart disease Father        I have reviewed the complete PMH, social history, surgical history, allergies and medications.  As well as family history.    Review of Systems see HPI otherwise negative  Objective:   There were no vitals taken for this visit.  Physical Exam   Constitutional: He is oriented to person, place, and time. He appears well-developed and well-nourished. No distress.   HENT:   Head: Normocephalic and atraumatic.   Eyes: Pupils are equal, round, and reactive to light. EOM are normal.   Neck: Normal range of  motion.   Pulmonary/Chest: Effort normal.   Musculoskeletal: Normal range of motion.   Neurological: He is alert and oriented to person, place, and time.   Skin: No rash noted. He is not diaphoretic.   Psychiatric: He has a normal mood and affect. His behavior is normal. Judgment and thought content normal. His mood appears not anxious. His affect is not angry, not blunt, not labile and not inappropriate. His speech is not rapid and/or pressured, not delayed, not tangential and not slurred. He is not agitated, not aggressive, not hyperactive, not slowed, not withdrawn, not actively hallucinating and not combative. Thought content is not paranoid and not delusional. Cognition and memory are normal. Cognition and memory are not impaired. He does not express impulsivity or inappropriate judgment. He does not exhibit a depressed mood. He expresses no homicidal and no suicidal ideation. He expresses no suicidal plans and no homicidal plans. He is communicative. He is attentive.       Assessment:     1. Encounter for long-term (current) use of medications    2. Attention deficit hyperactivity disorder (ADHD), predominantly inattentive type    3. Anxiety      MDM:   Moderate complexity.  Moderate risk.  Patient was evaluated by telemedicine during COVID-19 outbreak.  Patient is not having any symptoms of COVID-19.  I have Reviewed and summarized old records.  I have performed thorough medication reconciliation today and discussed risk and benefits of each medication.    I have signed for the following orders AND/OR meds.  No orders of the defined types were placed in this encounter.    Medications Ordered This Encounter   Medications    dextroamphetamine-amphetamine (ADDERALL) 10 mg Tab     Sig: Take 1 tablet (10 mg total) by mouth once daily.     Dispense:  30 tablet     Refill:  0    dextroamphetamine-amphetamine 10 mg Tab     Sig: Take 1 tablet (10 mg total) by mouth once daily.     Dispense:  30 tablet     Refill:  0     dextroamphetamine-amphetamine 10 mg Tab     Sig: Take 1 tablet (10 mg total) by mouth once daily.     Dispense:  30 tablet     Refill:  0    hydroxyzine HCL (ATARAX) 25 MG tablet     Sig: Take 2 tablets (50 mg total) by mouth nightly as needed for Anxiety.     Dispense:  60 tablet     Refill:  0        Follow up in about 3 months (around 8/14/2020), or if symptoms worsen or fail to improve, for ADHD VV.    If no improvement in symptoms or symptoms worsen, advised to call/follow-up at clinic or go to ER. Patient voiced understanding and all questions/concerns were addressed.     DISCLAIMER: This note was compiled by using a speech recognition dictation system and therefore please be aware that typographical / speech recognition errors can and do occur.  Please contact me if you see any errors specifically.    Reginald Best M.D.       Office: [phone] [address] [place] [address]  FAX: [phone]

## 2020-05-14 NOTE — ASSESSMENT & PLAN NOTE
Will avoid Xanax as patient is already on controlled substance for ADHD.  Start with hydroxyzine as needed at bedtime for anxiety and panic attacks.  If this fails may try BuSpar verses SSRI.  Discussed anxiety condition course.  Discussed SSRI as first-line treatment for this condition.  Discussed risk of discontinuing this medication without tapering.  Patient was educated, advised of side effects, and all questions were answered.  Patient voiced understanding.  Patient will follow up routinely and notify us if having any side effects or worsening or persistent symptoms.  ER precautions were given.

## 2020-05-14 NOTE — PATIENT INSTRUCTIONS
Follow up in about 3 months (around 8/14/2020), or if symptoms worsen or fail to improve, for ADHD VV.     If no improvement in symptoms or symptoms worsen, please be advised to call MD, follow-up at clinic and/or go to ER if becomes severe.    Reginald Best M.D.        We Offer TELEHEALTH & Same Day Appointments!   Book your Telehealth appointment with me through my nurse or   Clinic appointments on [facility]!    [address] [place] [address]    Office: [phone]   FAX: [phone]    Check out my Facebook Page and Follow Me at: [email or web address]    Check out my website at [employer] by clicking on: [email or web address]    To Schedule appointments online, go to [email or web address]: [email or web address]

## 2020-05-15 ENCOUNTER — PATIENT MESSAGE (OUTPATIENT)
Dept: FAMILY MEDICINE | Facility: CLINIC | Age: 30
End: 2020-05-15

## 2020-05-18 PROBLEM — Z13.220 ENCOUNTER FOR LIPID SCREENING FOR CARDIOVASCULAR DISEASE: Status: RESOLVED | Noted: 2020-02-14 | Resolved: 2020-05-18

## 2020-05-18 PROBLEM — Z13.6 ENCOUNTER FOR LIPID SCREENING FOR CARDIOVASCULAR DISEASE: Status: RESOLVED | Noted: 2020-02-14 | Resolved: 2020-05-18

## 2020-07-09 ENCOUNTER — OFFICE VISIT (OUTPATIENT)
Dept: FAMILY MEDICINE | Facility: CLINIC | Age: 30
End: 2020-07-09
Payer: COMMERCIAL

## 2020-07-09 DIAGNOSIS — Z71.9 ENCOUNTER FOR CONSULTATION: Primary | ICD-10-CM

## 2020-07-09 PROCEDURE — 99212 PR OFFICE/OUTPT VISIT, EST, LEVL II, 10-19 MIN: ICD-10-PCS | Mod: 95,,, | Performed by: NURSE PRACTITIONER

## 2020-07-09 PROCEDURE — 99212 OFFICE O/P EST SF 10 MIN: CPT | Mod: 95,,, | Performed by: NURSE PRACTITIONER

## 2020-07-09 NOTE — PROGRESS NOTES
"Subjective:       Patient ID: Jayjay Monge is a 30 y.o. male.    Chief Complaint: No chief complaint on file.  The patient location is: [place]  The chief complaint leading to consultation is: Requesting letter for 2 w off work    Visit type: audiovisual    Face to Face time with patient: 5 min  minutes of total time spent on the encounter, which includes face to face time and non-face to face time preparing to see the patient (eg, review of tests), Obtaining and/or reviewing separately obtained history, Documenting clinical information in the electronic or other health record, Independently interpreting results (not separately reported) and communicating results to the patient/family/caregiver, or Care coordination (not separately reported).     Each patient to whom he or she provides medical services by telemedicine is:  (1) informed of the relationship between the physician and patient and the respective role of any other health care provider with respect to management of the patient; and (2) notified that he or she may decline to receive medical services by telemedicine and may withdraw from such care at any time.    Pt scheduled visit today requesting a letter for 2 w off work. Pt states,"Can I have a letter to have 2 w off of work since the COVID-19 numbers are going up, since I'm high risk?" Pt has mild intermittent asthma; uses albuterol as needed. Pt has no COVID-19 symptoms or recent exposure. Pt was given a letter in April 2020 for this. Pt informed will consult with his PCP.  Past Medical History:   Diagnosis Date    ADHD (attention deficit hyperactivity disorder)     Asthma      Social History     Socioeconomic History    Marital status: Single     Spouse name: Not on file    Number of children: Not on file    Years of education: Not on file    Highest education level: Not on file   Occupational History     Employer: [employer]   Social Needs    Financial resource " strain: Not very hard    Food insecurity     Worry: Never true     Inability: Never true    Transportation needs     Medical: No     Non-medical: No   Tobacco Use    Smoking status: Current Every Day Smoker     Packs/day: 0.25     Years: 10.00     Pack years: 2.50     Types: Cigarettes     Start date: 4/12/2009    Smokeless tobacco: Never Used   Substance and Sexual Activity    Alcohol use: Yes     Frequency: Monthly or less     Drinks per session: 1 or 2     Binge frequency: Never    Drug use: No    Sexual activity: Yes     Partners: Female     Birth control/protection: Condom   Lifestyle    Physical activity     Days per week: 5 days     Minutes per session: 30 min    Stress: Only a little   Relationships    Social connections     Talks on phone: More than three times a week     Gets together: More than three times a week     Attends Gnosticism service: More than 4 times per year     Active member of club or organization: No     Attends meetings of clubs or organizations: Never     Relationship status: Never    Other Topics Concern    Not on file   Social History Narrative    Not on file     Past Surgical History:   Procedure Laterality Date    NOSE SURGERY            HPI  Review of Systems   Constitutional: Negative.  Negative for activity change and unexpected weight change.   HENT: Negative.  Negative for hearing loss, rhinorrhea and trouble swallowing.    Eyes: Negative.  Negative for discharge and visual disturbance.   Respiratory: Negative.  Negative for chest tightness and wheezing.    Cardiovascular: Negative.  Negative for chest pain and palpitations.   Gastrointestinal: Negative.  Negative for blood in stool, constipation, diarrhea and vomiting.   Endocrine: Negative.  Negative for polydipsia and polyuria.   Genitourinary: Negative.  Negative for difficulty urinating, hematuria and urgency.   Musculoskeletal: Negative.  Negative for arthralgias, joint swelling and neck pain.    Integumentary:  Negative.   Allergic/Immunologic: Negative.    Neurological: Negative.  Negative for weakness and headaches.   Psychiatric/Behavioral: Negative.  Negative for confusion and dysphoric mood.         Objective:      Physical Exam  Constitutional:       Appearance: Normal appearance.   Neurological:      Mental Status: He is alert.         Assessment:       1. Encounter for consultation        Plan:           Diagnoses and all orders for this visit:    Encounter for consultation  Consulted with PCP  Letter for 2 w off denied at this time         per mo rodriguez  1/8: relatively stable!  1/9: per primary  team!

## 2020-07-17 ENCOUNTER — TELEPHONE (OUTPATIENT)
Dept: FAMILY MEDICINE | Facility: CLINIC | Age: 30
End: 2020-07-17

## 2020-07-17 NOTE — TELEPHONE ENCOUNTER
Patient can return to work using safety precautions as the general population.  Form signed and pending fax.

## 2020-07-17 NOTE — TELEPHONE ENCOUNTER
Spoke with patient's mother to let her know that we had completed his paperwork and that he could pick it up and we will be faxing a copy to his employer.

## 2020-07-17 NOTE — TELEPHONE ENCOUNTER
"Patient did virtual visit with Leslie on 7/9/2020, now requesting for us to complete FMLA paperwork.  I have placed the paperwork in your basket.  See message that ms. Nelson documented in her notes below:    Pt scheduled visit today requesting a letter for 2 w off work. Pt states,"Can I have a letter to have 2 w off of work since the COVID-19 numbers are going up, since I'm high risk?" Pt has mild intermittent asthma; uses albuterol as needed. Pt has no COVID-19 symptoms or recent exposure. Pt was given a letter in April 2020 for this. Pt informed will consult with his PCP.  "

## 2020-07-30 DIAGNOSIS — Z79.899 ENCOUNTER FOR LONG-TERM (CURRENT) USE OF MEDICATIONS: ICD-10-CM

## 2020-07-30 DIAGNOSIS — F90.0 ATTENTION DEFICIT HYPERACTIVITY DISORDER (ADHD), PREDOMINANTLY INATTENTIVE TYPE: ICD-10-CM

## 2020-07-30 RX ORDER — DEXTROAMPHETAMINE SACCHARATE, AMPHETAMINE ASPARTATE, DEXTROAMPHETAMINE SULFATE AND AMPHETAMINE SULFATE 2.5; 2.5; 2.5; 2.5 MG/1; MG/1; MG/1; MG/1
10 TABLET ORAL DAILY
Qty: 30 TABLET | Refills: 0 | Status: SHIPPED | OUTPATIENT
Start: 2020-07-30 | End: 2020-09-17 | Stop reason: SDUPTHER

## 2020-09-17 DIAGNOSIS — F90.0 ATTENTION DEFICIT HYPERACTIVITY DISORDER (ADHD), PREDOMINANTLY INATTENTIVE TYPE: ICD-10-CM

## 2020-09-17 DIAGNOSIS — Z79.899 ENCOUNTER FOR LONG-TERM (CURRENT) USE OF MEDICATIONS: ICD-10-CM

## 2020-09-17 RX ORDER — DEXTROAMPHETAMINE SACCHARATE, AMPHETAMINE ASPARTATE, DEXTROAMPHETAMINE SULFATE AND AMPHETAMINE SULFATE 2.5; 2.5; 2.5; 2.5 MG/1; MG/1; MG/1; MG/1
10 TABLET ORAL DAILY
Qty: 30 TABLET | Refills: 0 | Status: SHIPPED | OUTPATIENT
Start: 2020-09-17 | End: 2021-04-19

## 2020-12-15 DIAGNOSIS — F90.0 ATTENTION DEFICIT HYPERACTIVITY DISORDER (ADHD), PREDOMINANTLY INATTENTIVE TYPE: ICD-10-CM

## 2020-12-15 DIAGNOSIS — Z79.899 ENCOUNTER FOR LONG-TERM (CURRENT) USE OF MEDICATIONS: ICD-10-CM

## 2020-12-15 RX ORDER — DEXTROAMPHETAMINE SACCHARATE, AMPHETAMINE ASPARTATE, DEXTROAMPHETAMINE SULFATE AND AMPHETAMINE SULFATE 2.5; 2.5; 2.5; 2.5 MG/1; MG/1; MG/1; MG/1
10 TABLET ORAL DAILY
Qty: 30 TABLET | Refills: 0 | OUTPATIENT
Start: 2020-12-15 | End: 2021-01-14

## 2020-12-15 NOTE — TELEPHONE ENCOUNTER
----- Message from Shelby Flores sent at 12/15/2020  1:48 PM CST -----  Regarding: pt mom - Yeni  .Type:  RX Refill Request    Who Called: pt   Refill or New Rx:refill  RX Name and Strength: Adderall 10mg   How is the patient currently taking it? (ex. 1XDay):1xday   Is this a 30 day or 90 day Rx:30day   Preferred Pharmacy with phone number:  [facility]/pharmacy #[id number] [place], [place] - [address] [facility] [address] [place]  [address] [place] [address]  Phone: [phone] Fax: [phone]      Local or Mail Order:local   Ordering Provider:   Would the patient rather a call back or a response via [facility]? Call back   Best Call Back Number:.[phone]   Additional Information:       Thank You ,   Shelby Flores

## 2020-12-16 ENCOUNTER — PATIENT MESSAGE (OUTPATIENT)
Dept: FAMILY MEDICINE | Facility: CLINIC | Age: 30
End: 2020-12-16

## 2020-12-16 DIAGNOSIS — Z79.899 ENCOUNTER FOR LONG-TERM (CURRENT) USE OF MEDICATIONS: ICD-10-CM

## 2020-12-16 DIAGNOSIS — F90.0 ATTENTION DEFICIT HYPERACTIVITY DISORDER (ADHD), PREDOMINANTLY INATTENTIVE TYPE: ICD-10-CM

## 2020-12-16 RX ORDER — DEXTROAMPHETAMINE SACCHARATE, AMPHETAMINE ASPARTATE, DEXTROAMPHETAMINE SULFATE AND AMPHETAMINE SULFATE 2.5; 2.5; 2.5; 2.5 MG/1; MG/1; MG/1; MG/1
10 TABLET ORAL DAILY
Qty: 30 TABLET | Refills: 0 | OUTPATIENT
Start: 2020-12-16 | End: 2021-01-15

## 2020-12-16 NOTE — TELEPHONE ENCOUNTER
Called and notified patient's mom of denial of medication request as  stated patient needs an appointment.  An appointment was set up for the patient for today at 2 pm with Dr. Coyle for medication refill and a 3 mo appointment for med refill set up in office to see Estephania Laws.  Patient's mom verbalized understanding of this.

## 2020-12-16 NOTE — TELEPHONE ENCOUNTER
Medication refused due to failing protocol.    Requested Prescriptions   Pending Prescriptions Disp Refills    dextroamphetamine-amphetamine 10 mg Tab 30 tablet 0     Sig: Take 1 tablet (10 mg total) by mouth once daily.       ADD/ADHD Refill Protocol Failed - 12/16/2020  7:34 AM        Failed - Patient seen within 3 months     Last visit with Reginald Best MD: 5/14/2020  Last visit in [facility]: 7/9/2020    Patient's next visit in [facility]: 12/16/2020           Passed - Med not refilled within 4 weeks

## 2021-01-04 ENCOUNTER — PATIENT MESSAGE (OUTPATIENT)
Dept: FAMILY MEDICINE | Facility: CLINIC | Age: 31
End: 2021-01-04

## 2021-04-19 ENCOUNTER — TELEPHONE (OUTPATIENT)
Dept: FAMILY MEDICINE | Facility: CLINIC | Age: 31
End: 2021-04-19

## 2021-04-19 ENCOUNTER — OFFICE VISIT (OUTPATIENT)
Dept: FAMILY MEDICINE | Facility: CLINIC | Age: 31
End: 2021-04-19
Payer: MEDICAID

## 2021-04-19 DIAGNOSIS — Z13.220 ENCOUNTER FOR LIPID SCREENING FOR CARDIOVASCULAR DISEASE: ICD-10-CM

## 2021-04-19 DIAGNOSIS — J45.909 ASTHMA, UNSPECIFIED ASTHMA SEVERITY, UNSPECIFIED WHETHER COMPLICATED, UNSPECIFIED WHETHER PERSISTENT: ICD-10-CM

## 2021-04-19 DIAGNOSIS — Z79.899 ENCOUNTER FOR LONG-TERM (CURRENT) USE OF MEDICATIONS: Primary | ICD-10-CM

## 2021-04-19 DIAGNOSIS — F17.210 CIGARETTE SMOKER: ICD-10-CM

## 2021-04-19 DIAGNOSIS — Z13.6 ENCOUNTER FOR LIPID SCREENING FOR CARDIOVASCULAR DISEASE: ICD-10-CM

## 2021-04-19 DIAGNOSIS — F90.0 ATTENTION DEFICIT HYPERACTIVITY DISORDER (ADHD), PREDOMINANTLY INATTENTIVE TYPE: ICD-10-CM

## 2021-04-19 PROCEDURE — 99214 OFFICE O/P EST MOD 30 MIN: CPT | Mod: 95,,, | Performed by: FAMILY MEDICINE

## 2021-04-19 PROCEDURE — 99214 PR OFFICE/OUTPT VISIT, EST, LEVL IV, 30-39 MIN: ICD-10-PCS | Mod: 95,,, | Performed by: FAMILY MEDICINE

## 2021-04-19 RX ORDER — NAPROXEN 500 MG/1
TABLET ORAL
COMMUNITY

## 2021-04-19 RX ORDER — DEXTROAMPHETAMINE SACCHARATE, AMPHETAMINE ASPARTATE, DEXTROAMPHETAMINE SULFATE AND AMPHETAMINE SULFATE 2.5; 2.5; 2.5; 2.5 MG/1; MG/1; MG/1; MG/1
10 TABLET ORAL DAILY
Qty: 30 TABLET | Refills: 0 | Status: CANCELLED | OUTPATIENT
Start: 2021-04-19 | End: 2021-05-19

## 2021-04-19 RX ORDER — DEXTROAMPHETAMINE SACCHARATE, AMPHETAMINE ASPARTATE, DEXTROAMPHETAMINE SULFATE AND AMPHETAMINE SULFATE 2.5; 2.5; 2.5; 2.5 MG/1; MG/1; MG/1; MG/1
10 TABLET ORAL DAILY
Qty: 30 TABLET | Refills: 0 | Status: CANCELLED | OUTPATIENT
Start: 2021-05-18 | End: 2021-06-17

## 2021-04-19 RX ORDER — FLUTICASONE PROPIONATE 50 MCG
SPRAY, SUSPENSION (ML) NASAL
COMMUNITY

## 2021-04-19 RX ORDER — DEXTROAMPHETAMINE SACCHARATE, AMPHETAMINE ASPARTATE, DEXTROAMPHETAMINE SULFATE AND AMPHETAMINE SULFATE 5; 5; 5; 5 MG/1; MG/1; MG/1; MG/1
1 TABLET ORAL DAILY
Qty: 30 TABLET | Refills: 0 | Status: SHIPPED | OUTPATIENT
Start: 2021-04-19 | End: 2021-05-19

## 2021-04-19 RX ORDER — DEXTROAMPHETAMINE SACCHARATE, AMPHETAMINE ASPARTATE, DEXTROAMPHETAMINE SULFATE AND AMPHETAMINE SULFATE 2.5; 2.5; 2.5; 2.5 MG/1; MG/1; MG/1; MG/1
10 TABLET ORAL DAILY
Qty: 30 TABLET | Refills: 0 | Status: CANCELLED | OUTPATIENT
Start: 2021-06-17 | End: 2021-07-17

## 2022-05-31 ENCOUNTER — PATIENT MESSAGE (OUTPATIENT)
Dept: FAMILY MEDICINE | Facility: CLINIC | Age: 32
End: 2022-05-31
Payer: MEDICAID

## 2022-07-07 ENCOUNTER — PATIENT MESSAGE (OUTPATIENT)
Dept: FAMILY MEDICINE | Facility: CLINIC | Age: 32
End: 2022-07-07
Payer: MEDICAID

## 2022-07-07 NOTE — TELEPHONE ENCOUNTER
Set up virtual visit with an available provider.  I would recommend starting hydrocortisone cream or rectal foam whichever one is covered.  If no improvement he will need to be referred to Colorectal surgery.

## 2024-01-19 ENCOUNTER — PATIENT MESSAGE (OUTPATIENT)
Dept: FAMILY MEDICINE | Facility: CLINIC | Age: 34
End: 2024-01-19
Payer: MEDICAID

## 2024-04-19 ENCOUNTER — TELEPHONE (OUTPATIENT)
Dept: FAMILY MEDICINE | Facility: CLINIC | Age: 34
End: 2024-04-19
Payer: MEDICAID

## 2024-04-19 NOTE — TELEPHONE ENCOUNTER
----- Message from Yasmine Kearns sent at 4/19/2024  8:16 AM CDT -----  Contact: pt's mom/Yeni  Type:  Same Day Appointment Request    Caller is requesting a same day appointment.  Caller declined first available appointment listed below.    Name of Caller: pt's mom/Yeni  When is the first available appointment? No solutions  Symptoms: problems w/scrotums  Best Call Back Number: [phone]  Additional Information:

## 2024-05-21 ENCOUNTER — TELEPHONE (OUTPATIENT)
Dept: FAMILY MEDICINE | Facility: CLINIC | Age: 34
End: 2024-05-21
Payer: MEDICAID

## 2024-07-26 ENCOUNTER — TELEPHONE (OUTPATIENT)
Dept: FAMILY MEDICINE | Facility: CLINIC | Age: 34
End: 2024-07-26
Payer: MEDICAID

## 2024-07-26 ENCOUNTER — HOSPITAL ENCOUNTER (OUTPATIENT)
Dept: RADIOLOGY | Facility: HOSPITAL | Age: 34
Discharge: HOME | End: 2024-07-26
Attending: NURSE PRACTITIONER
Payer: MEDICAID

## 2024-07-26 ENCOUNTER — OFFICE VISIT (OUTPATIENT)
Dept: FAMILY MEDICINE | Facility: CLINIC | Age: 34
End: 2024-07-26
Payer: MEDICAID

## 2024-07-26 VITALS
BODY MASS INDEX: 19.81 KG/M2 | WEIGHT: 130.69 LBS | RESPIRATION RATE: 18 BRPM | DIASTOLIC BLOOD PRESSURE: 68 MMHG | HEART RATE: 88 BPM | OXYGEN SATURATION: 96 % | TEMPERATURE: 98 F | SYSTOLIC BLOOD PRESSURE: 110 MMHG | HEIGHT: 68 IN
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
## 2024-07-26 DIAGNOSIS — R07.81 RIB PAIN ON RIGHT SIDE: ICD-10-CM

## 2024-07-26 PROCEDURE — 99999 PR PBB SHADOW E&M-EST. PATIENT-LVL V: CPT | Mod: PBBFAC,,, | Performed by: NURSE PRACTITIONER

## 2024-07-26 PROCEDURE — 99215 OFFICE O/P EST HI 40 MIN: CPT | Mod: PBBFAC,PO,25 | Performed by: NURSE PRACTITIONER

## 2024-07-26 PROCEDURE — 71100 X-RAY EXAM RIBS UNI 2 VIEWS: CPT | Mod: 26,RT,, | Performed by: RADIOLOGY

## 2024-07-26 PROCEDURE — 71100 X-RAY EXAM RIBS UNI 2 VIEWS: CPT | Mod: TC,PO,RT

## 2024-07-26 RX ORDER — IBUPROFEN 800 MG/1
800 TABLET ORAL EVERY 6 HOURS PRN
Qty: 3 TABLET | Refills: 0 | Status: SHIPPED | OUTPATIENT
Start: 2024-07-26

## 2024-07-26 RX ORDER — TIZANIDINE 2 MG/1
2 TABLET ORAL EVERY 8 HOURS PRN
Qty: 30 TABLET | Refills: 0 | Status: SHIPPED | OUTPATIENT
Start: 2024-07-26 | End: 2024-08-05

## 2024-07-26 NOTE — PROGRESS NOTES
Assessment/Plan:  Problem List Items Addressed This Visit    None  Visit Diagnoses       Fall, initial encounter    -  Primary    Rib pain on right side        Relevant Medications    ibuprofen (ADVIL,MOTRIN) 800 MG tablet    tiZANidine (ZANAFLEX) 2 MG tablet    Other Relevant Orders    X-Ray Ribs 2 View Right        Will obtain x-ray today  He was provided a letter for work  Start ibuprofen and tizanidine as prescribed  Supportive care- rest, ice, heat, avoid heavy lifting, limit strenuous activity.   Follow up if symptoms worsen or fail to improve.  ER precautions for severe or worsening symptoms.     Shabnam Christianson NP  _____________________________________________________________________________________________________________________________________________________    CC: rib pain     HPI: Patient is a 34-year-old male who presents in clinic today accompanied by his mother as an established patient here for rib pain. He reports fall x3 days ago. He reports that he tripped over his roommates cat and fell landing on his right side. He did not hit his head. There was no loss of consciousness. He has had constant pain since the fall to his right ribs. Worse with moving, coughing, sneezing, deep breathing. He has not tried taking anything for the pain. The severity is rated 9/10. There is no chest pain, shortness of breathing, difficulty breathing.     Past Medical History:  Past Medical History:   Diagnosis Date    ADHD (attention deficit hyperactivity disorder)     Asthma      Past Surgical History:   Procedure Laterality Date    NOSE SURGERY       Review of patient's allergies indicates:   Allergen Reactions    Erythromycin Hives    Zithromax [azithromycin] Hives     Social History     Tobacco Use    Smoking status: Every Day     Current packs/day: 0.25     Average packs/day: 0.3 packs/day for 15.3 years (3.8 ttl pk-yrs)     Types: Cigarettes     Start date: 4/12/2009    Smokeless tobacco: Never   Substance Use  "Topics    Alcohol use: Yes    Drug use: No     Family History   Problem Relation Name Age of Onset    Mitral valve prolapse Mother      ADD / ADHD Mother      Diabetes Father      Heart disease Father       Current Outpatient Medications on File Prior to Visit   Medication Sig Dispense Refill    fluticasone propionate (FLONASE ALLERGY RELIEF) 50 mcg/actuation nasal spray Flonase Allergy Relief 50 mcg/actuation nasal spray,suspension   2 sprays to each nare once daily      hydrOXYzine HCL (ATARAX) 25 MG tablet TAKE 2 TABLETS (50 MG TOTAL) BY MOUTH NIGHTLY AS NEEDED FOR ANXIETY. 60 tablet 0    VENTOLIN HFA 90 mcg/actuation inhaler INHALE 2 PUFFS BY MOUTH INTO THE LUNGS EVERY 6 HOURS AS NEEDED FOR WHEEZING. RESCUE 18 g 0    [DISCONTINUED] naproxen (NAPROSYN) 500 MG tablet naproxen 500 mg tablet      dextroamphetamine-amphetamine (ADDERALL) 20 mg tablet Take 1 tablet by mouth once daily. 30 tablet 0     No current facility-administered medications on file prior to visit.     Review of Systems   Constitutional:  Negative for appetite change, chills, fatigue and fever.   HENT:  Negative for congestion, rhinorrhea and sore throat.    Eyes:  Negative for visual disturbance.   Respiratory:  Negative for cough and shortness of breath.    Cardiovascular:  Negative for chest pain, palpitations and leg swelling.   Gastrointestinal:  Negative for abdominal pain, diarrhea and vomiting.   Genitourinary:  Negative for difficulty urinating, dysuria and hematuria.   Musculoskeletal:  Positive for arthralgias (Rib pain). Negative for myalgias.   Skin:  Negative for rash and wound.   Neurological:  Negative for dizziness and headaches.   Psychiatric/Behavioral:  Negative for behavioral problems. The patient is not nervous/anxious.      Vitals:    07/26/24 1022   BP: 110/68   Pulse: 88   Resp: 18   Temp: 98.1 °F (36.7 °C)   TempSrc: Oral   SpO2: 96%   Weight: 59.3 kg (130 lb 11.2 oz)   Height: 5' 8" (1.727 m)     Wt Readings from Last 3 " Encounters:   07/26/24 59.3 kg (130 lb 11.2 oz)   02/14/20 59.9 kg (132 lb)   12/18/19 56.7 kg (125 lb)     Physical Exam  Vitals reviewed.   Constitutional:       General: He is not in acute distress.     Appearance: Normal appearance. He is not ill-appearing.      Comments: Mother present   HENT:      Head: Normocephalic and atraumatic.      Right Ear: External ear normal.      Left Ear: External ear normal.      Nose: Nose normal.   Eyes:      Extraocular Movements: Extraocular movements intact.      Conjunctiva/sclera: Conjunctivae normal.   Cardiovascular:      Rate and Rhythm: Normal rate.      Heart sounds: Normal heart sounds.   Pulmonary:      Effort: Pulmonary effort is normal. No respiratory distress.      Breath sounds: Normal breath sounds. No wheezing.   Chest:      Chest wall: Tenderness present. No deformity, swelling or crepitus.       Abdominal:      General: Abdomen is flat. There is no distension.   Musculoskeletal:         General: Normal range of motion.      Cervical back: Normal range of motion.   Skin:     General: Skin is warm and dry.      Capillary Refill: Capillary refill takes less than 2 seconds.      Coloration: Skin is not pale.      Findings: No rash.   Neurological:      General: No focal deficit present.      Mental Status: He is alert and oriented to person, place, and time. Mental status is at baseline.   Psychiatric:         Mood and Affect: Mood normal.         Speech: Speech normal. Speech is not rapid and pressured, delayed or slurred.         Behavior: Behavior normal. Behavior is not agitated, slowed, aggressive, withdrawn, hyperactive or combative. Behavior is cooperative.         Thought Content: Thought content normal.         Judgment: Judgment normal.       Health Maintenance   Topic Date Due    Lipid Panel  Never done    TETANUS VACCINE  04/02/2019    Hepatitis C Screening  Completed

## 2024-07-26 NOTE — LETTER
July 26, 2024      [facility], [place], operated by [facility]  [address] [place] [address]  Phone: [phone]  Fax: [phone]       Patient: Jayjay Monge   YOB: 1990  Date of Visit: 07/26/2024    To Whom It May Concern:    Morgan Monge  was at [facility] on 07/26/2024. The patient may return to work/school on 07/31/2024 with no restrictions. If you have any questions or concerns, or if I can be of further assistance, please do not hesitate to contact me.    Sincerely,    Eliot Hartman LPN

## 2024-07-26 NOTE — TELEPHONE ENCOUNTER
----- Message from Connie Dougherty sent at 7/26/2024  7:59 AM CDT -----  Contact: self  [phone]  Type:  Same Day Appointment Request    Caller is requesting a same day appointment.  Caller declined first available appointment listed below.    Name of Caller:Jayjay  When is the first available appointment?non populated  Symptoms:back pain  Best Call Back Number: [phone]  Additional Information:

## 2024-09-20 ENCOUNTER — TELEPHONE (OUTPATIENT)
Dept: FAMILY MEDICINE | Facility: CLINIC | Age: 34
End: 2024-09-20
Payer: MEDICAID

## 2024-10-01 ENCOUNTER — OFFICE VISIT (OUTPATIENT)
Dept: FAMILY MEDICINE | Facility: CLINIC | Age: 34
End: 2024-10-01
Payer: MEDICAID

## 2024-10-01 ENCOUNTER — LAB VISIT (OUTPATIENT)
Dept: LAB | Facility: HOSPITAL | Age: 34
End: 2024-10-01
Attending: NURSE PRACTITIONER

## 2024-10-01 VITALS
OXYGEN SATURATION: 96 % | SYSTOLIC BLOOD PRESSURE: 112 MMHG | RESPIRATION RATE: 18 BRPM | DIASTOLIC BLOOD PRESSURE: 62 MMHG | HEIGHT: 68 IN | BODY MASS INDEX: 19.87 KG/M2 | HEART RATE: 95 BPM

## 2024-10-01 DIAGNOSIS — Z09 HOSPITAL DISCHARGE FOLLOW-UP: Primary | ICD-10-CM

## 2024-10-01 DIAGNOSIS — R10.31 RIGHT LOWER QUADRANT PAIN: ICD-10-CM

## 2024-10-01 DIAGNOSIS — N20.0 KIDNEY STONE: ICD-10-CM

## 2024-10-01 LAB
ALBUMIN SERPL BCP-MCNC: 4.1 G/DL (ref 3.5–5.2)
ALP SERPL-CCNC: 91 U/L (ref 55–135)
ALT SERPL W/O P-5'-P-CCNC: 38 U/L (ref 10–44)
ANION GAP SERPL CALC-SCNC: 8 MMOL/L (ref 8–16)
AST SERPL-CCNC: 29 U/L (ref 10–40)
BILIRUB SERPL-MCNC: 1.1 MG/DL (ref 0.1–1)
BILIRUB UR QL STRIP: NEGATIVE
BUN SERPL-MCNC: 10 MG/DL (ref 6–20)
CALCIUM SERPL-MCNC: 9.7 MG/DL (ref 8.7–10.5)
CHLORIDE SERPL-SCNC: 106 MMOL/L (ref 95–110)
CLARITY UR: CLEAR
CO2 SERPL-SCNC: 24 MMOL/L (ref 23–29)
COLOR UR: YELLOW
CREAT SERPL-MCNC: 1 MG/DL (ref 0.5–1.4)
ERYTHROCYTE [DISTWIDTH] IN BLOOD BY AUTOMATED COUNT: 14 % (ref 11.5–14.5)
EST. GFR  (NO RACE VARIABLE): >60 ML/MIN/1.73 M^2
GLUCOSE SERPL-MCNC: 94 MG/DL (ref 70–110)
GLUCOSE UR QL STRIP: NEGATIVE
HCT VFR BLD AUTO: 41.7 % (ref 40–54)
HGB BLD-MCNC: 13.1 G/DL (ref 14–18)
HGB UR QL STRIP: NEGATIVE
KETONES UR QL STRIP: NEGATIVE
LEUKOCYTE ESTERASE UR QL STRIP: NEGATIVE
MCH RBC QN AUTO: 31.8 PG (ref 27–31)
MCHC RBC AUTO-ENTMCNC: 31.4 G/DL (ref 32–36)
MCV RBC AUTO: 101 FL (ref 82–98)
NITRITE UR QL STRIP: NEGATIVE
PH UR STRIP: 6.5 [PH] (ref 5–8)
PLATELET # BLD AUTO: 271 K/UL (ref 150–450)
PMV BLD AUTO: 10.7 FL (ref 9.2–12.9)
POTASSIUM SERPL-SCNC: 4.3 MMOL/L (ref 3.5–5.1)
PROT SERPL-MCNC: 6.9 G/DL (ref 6–8.4)
PROT UR QL STRIP: NEGATIVE
RBC # BLD AUTO: 4.12 M/UL (ref 4.6–6.2)
SODIUM SERPL-SCNC: 138 MMOL/L (ref 136–145)
SP GR UR STRIP: 1.01 (ref 1–1.03)
URN SPEC COLLECT METH UR: NORMAL
WBC # BLD AUTO: 6.1 K/UL (ref 3.9–12.7)

## 2024-10-01 PROCEDURE — 99999 PR PBB SHADOW E&M-EST. PATIENT-LVL IV: CPT | Mod: PBBFAC,,, | Performed by: NURSE PRACTITIONER

## 2024-10-01 PROCEDURE — 36415 COLL VENOUS BLD VENIPUNCTURE: CPT | Mod: PO | Performed by: NURSE PRACTITIONER

## 2024-10-01 PROCEDURE — 3078F DIAST BP <80 MM HG: CPT | Mod: CPTII,,, | Performed by: NURSE PRACTITIONER

## 2024-10-01 PROCEDURE — 80053 COMPREHEN METABOLIC PANEL: CPT | Performed by: NURSE PRACTITIONER

## 2024-10-01 PROCEDURE — 3074F SYST BP LT 130 MM HG: CPT | Mod: CPTII,,, | Performed by: NURSE PRACTITIONER

## 2024-10-01 PROCEDURE — 85027 COMPLETE CBC AUTOMATED: CPT | Performed by: NURSE PRACTITIONER

## 2024-10-01 PROCEDURE — 99214 OFFICE O/P EST MOD 30 MIN: CPT | Mod: S$PBB,,, | Performed by: NURSE PRACTITIONER

## 2024-10-01 PROCEDURE — 3008F BODY MASS INDEX DOCD: CPT | Mod: CPTII,,, | Performed by: NURSE PRACTITIONER

## 2024-10-01 PROCEDURE — 1160F RVW MEDS BY RX/DR IN RCRD: CPT | Mod: CPTII,,, | Performed by: NURSE PRACTITIONER

## 2024-10-01 PROCEDURE — 99214 OFFICE O/P EST MOD 30 MIN: CPT | Mod: PBBFAC,PO | Performed by: NURSE PRACTITIONER

## 2024-10-01 PROCEDURE — 81003 URINALYSIS AUTO W/O SCOPE: CPT | Mod: PO | Performed by: NURSE PRACTITIONER

## 2024-10-01 PROCEDURE — 1159F MED LIST DOCD IN RCRD: CPT | Mod: CPTII,,, | Performed by: NURSE PRACTITIONER

## 2024-10-01 NOTE — PROGRESS NOTES
Assessment/Plan:  Problem List Items Addressed This Visit    None  Visit Diagnoses       Hospital discharge follow-up    -  Primary    Kidney stone        Relevant Orders    Ambulatory referral/consult to Urology    Urinalysis, Reflex to Urine Culture Urine, Clean Catch    Right lower quadrant pain        Relevant Orders    CT Abdomen Pelvis  Without Contrast    CBC Without Differential    Comprehensive Metabolic Panel    CBC Auto Differential        Labs today and U/A today   I have ordered STAT CT abdomen  Referral to urology for reported kidney stones; Will request external records   Follow up if symptoms worsen or fail to improve.  STRICT ER precautions for severe or worsening symptoms. Patient verbalized understanding.     Shabnam Christianson NP  _____________________________________________________________________________________________________________________________________________________    History of Present Illness    CHIEF COMPLAINT:  Mr. Monge presents today for suspected kidney stone.    KIDNEY STONE AND UTI:  He reports a first-time occurrence of kidney stones, diagnosed two weeks ago at [facility] in [place], [place]. He was initially evaluated for suspected appendicitis, but a CT on September 19th revealed a kidney stone. He was also diagnosed with a UTI. He reports pain in both the right lower quadrant and right flank area. Currently rating it as 6/10, but states it reached 10/10 at its worst, particularly at night. The pain worsens when lying down and changes position. He experiences abdominal tenderness, particularly in the RLQ. He denies fever, chills, sweats, nausea, vomiting, diarrhea, constipation. He reports some pain when urinating but denies burning sensation or urinary urgency. He was prescribed Bactrim twice daily for 10 days for the UTI, which he has not yet completed. He has been taking tramadol and ibuprofen for pain with some relief reported. He denies any prior  history of kidney stones. He works as a . He usually drinks two Cokes per day.    Past Medical History:  Past Medical History:   Diagnosis Date    ADHD (attention deficit hyperactivity disorder)     Asthma      Past Surgical History:   Procedure Laterality Date    NOSE SURGERY       Review of patient's allergies indicates:   Allergen Reactions    Erythromycin Hives    Zithromax [azithromycin] Hives     Social History     Tobacco Use    Smoking status: Every Day     Current packs/day: 0.25     Average packs/day: 0.3 packs/day for 15.5 years (3.9 ttl pk-yrs)     Types: Cigarettes     Start date: 4/12/2009    Smokeless tobacco: Never   Substance Use Topics    Alcohol use: Yes    Drug use: No     Family History   Problem Relation Name Age of Onset    Mitral valve prolapse Mother      ADD / ADHD Mother      Diabetes Father      Heart disease Father       Current Outpatient Medications on File Prior to Visit   Medication Sig Dispense Refill    fluticasone propionate (FLONASE ALLERGY RELIEF) 50 mcg/actuation nasal spray Flonase Allergy Relief 50 mcg/actuation nasal spray,suspension   2 sprays to each nare once daily      VENTOLIN HFA 90 mcg/actuation inhaler INHALE 2 PUFFS BY MOUTH INTO THE LUNGS EVERY 6 HOURS AS NEEDED FOR WHEEZING. RESCUE 18 g 0    [DISCONTINUED] dextroamphetamine-amphetamine (ADDERALL) 20 mg tablet Take 1 tablet by mouth once daily. 30 tablet 0    [DISCONTINUED] hydrOXYzine HCL (ATARAX) 25 MG tablet TAKE 2 TABLETS (50 MG TOTAL) BY MOUTH NIGHTLY AS NEEDED FOR ANXIETY. 60 tablet 0    [DISCONTINUED] ibuprofen (ADVIL,MOTRIN) 800 MG tablet Take 1 tablet (800 mg total) by mouth every 6 (six) hours as needed for Pain. 3 tablet 00     No current facility-administered medications on file prior to visit.     Review of Systems   Constitutional:  Negative for appetite change, chills, diaphoresis, fatigue, fever and unexpected weight change.   HENT:  Negative for congestion, rhinorrhea and sore throat.   "  Eyes:  Negative for visual disturbance.   Respiratory:  Negative for cough and shortness of breath.    Cardiovascular:  Negative for chest pain, palpitations and leg swelling.   Gastrointestinal:  Positive for abdominal pain. Negative for blood in stool, constipation, diarrhea and vomiting.   Genitourinary:  Positive for difficulty urinating and flank pain. Negative for dysuria and hematuria.   Musculoskeletal:  Negative for arthralgias and myalgias.   Skin:  Negative for rash and wound.   Neurological:  Negative for dizziness and headaches.   Psychiatric/Behavioral:  Negative for behavioral problems. The patient is not nervous/anxious.      Vitals:    10/01/24 1045   BP: 112/62   Pulse: 95   Resp: 18   SpO2: 96%   Height: 5' 8" (1.727 m)     Wt Readings from Last 3 Encounters:   07/26/24 59.3 kg (130 lb 11.2 oz)   02/14/20 59.9 kg (132 lb)   12/18/19 56.7 kg (125 lb)     Physical Exam  Vitals reviewed.   Constitutional:       General: He is not in acute distress.     Appearance: Normal appearance. He is not ill-appearing.      Comments: Tall, thin. Appears uncomfortable. Here with his mother.    HENT:      Head: Normocephalic and atraumatic.      Right Ear: External ear normal.      Left Ear: External ear normal.      Nose: Nose normal.   Eyes:      Extraocular Movements: Extraocular movements intact.      Conjunctiva/sclera: Conjunctivae normal.   Cardiovascular:      Rate and Rhythm: Normal rate.      Heart sounds: Normal heart sounds.   Pulmonary:      Effort: Pulmonary effort is normal. No respiratory distress.      Breath sounds: Normal breath sounds.   Abdominal:      General: Abdomen is flat. Bowel sounds are normal. There is no distension.      Palpations: Abdomen is soft.      Tenderness: There is abdominal tenderness in the right lower quadrant. There is right CVA tenderness.   Musculoskeletal:         General: Normal range of motion.      Cervical back: Normal range of motion.   Skin:     General: Skin " is warm and dry.      Capillary Refill: Capillary refill takes less than 2 seconds.      Coloration: Skin is not pale.      Findings: No rash.   Neurological:      General: No focal deficit present.      Mental Status: He is alert and oriented to person, place, and time. Mental status is at baseline.   Psychiatric:         Mood and Affect: Mood normal.         Speech: Speech normal. Speech is not rapid and pressured, delayed or slurred.         Behavior: Behavior normal. Behavior is not agitated, slowed, aggressive, withdrawn, hyperactive or combative. Behavior is cooperative.         Thought Content: Thought content normal.         Judgment: Judgment normal.       Health Maintenance   Topic Date Due    Lipid Panel  Never done    TETANUS VACCINE  10/01/2025 (Originally 4/2/2019)    Hepatitis C Screening  Completed     DISCLAIMER: This note was compiled by using a speech recognition dictation system and therefore please be aware that typographical / speech recognition errors can and do occur.  Please contact me if you see any errors specifically.  Consent was obtained for [employer] recording system prior to the visit.

## 2024-10-04 ENCOUNTER — TELEPHONE (OUTPATIENT)
Dept: FAMILY MEDICINE | Facility: CLINIC | Age: 34
End: 2024-10-04
Payer: COMMERCIAL

## 2024-10-04 ENCOUNTER — PATIENT MESSAGE (OUTPATIENT)
Dept: FAMILY MEDICINE | Facility: CLINIC | Age: 34
End: 2024-10-04
Payer: COMMERCIAL

## 2024-10-04 DIAGNOSIS — D64.9 ANEMIA, UNSPECIFIED TYPE: Primary | ICD-10-CM

## 2024-10-04 NOTE — TELEPHONE ENCOUNTER
----- Message from Med Assistant Talamatnes sent at 10/2/2024  4:21 PM CDT -----  Regarding: FW: Lab Orders    ----- Message -----  From: Albin Crockett MA  Sent: 10/2/2024   8:00 AM CDT  To: Albin Crockett MA  Subject: FW: Lab Orders                                     ----- Message -----  From: Val Dumont  Sent: 10/1/2024   4:55 PM CDT  To: Sammy Haque Staff  Subject: Lab Orders                                       Please put in new CBC orders for this patient, CBC orders from today had to be cancelled since they cannot be used in the future due the patient being checked in to be drawn today. Thank you